# Patient Record
Sex: FEMALE | Race: WHITE | Employment: FULL TIME
[De-identification: names, ages, dates, MRNs, and addresses within clinical notes are randomized per-mention and may not be internally consistent; named-entity substitution may affect disease eponyms.]

---

## 2017-02-03 ENCOUNTER — SURGERY (OUTPATIENT)
Age: 57
End: 2017-02-03

## 2017-11-20 PROBLEM — E55.9 VITAMIN D DEFICIENCY: Status: ACTIVE | Noted: 2017-11-20

## 2018-04-20 ENCOUNTER — SURGERY (OUTPATIENT)
Age: 58
End: 2018-04-20

## 2018-04-20 ENCOUNTER — HOSPITAL ENCOUNTER (OUTPATIENT)
Facility: HOSPITAL | Age: 58
Setting detail: HOSPITAL OUTPATIENT SURGERY
Discharge: HOME | End: 2018-04-20
Attending: INTERNAL MEDICINE | Admitting: INTERNAL MEDICINE
Payer: COMMERCIAL

## 2018-04-20 DIAGNOSIS — K51.019 ULCERATIVE PANCOLITIS WITH COMPLICATION (HCC): ICD-10-CM

## 2018-04-20 PROCEDURE — 0DBM8ZX EXCISION OF DESCENDING COLON, VIA NATURAL OR ARTIFICIAL OPENING ENDOSCOPIC, DIAGNOSTIC: ICD-10-PCS | Performed by: INTERNAL MEDICINE

## 2018-04-20 PROCEDURE — 99153 MOD SED SAME PHYS/QHP EA: CPT | Performed by: INTERNAL MEDICINE

## 2018-04-20 PROCEDURE — 0DBP8ZX EXCISION OF RECTUM, VIA NATURAL OR ARTIFICIAL OPENING ENDOSCOPIC, DIAGNOSTIC: ICD-10-PCS | Performed by: INTERNAL MEDICINE

## 2018-04-20 PROCEDURE — 88305 TISSUE EXAM BY PATHOLOGIST: CPT | Performed by: INTERNAL MEDICINE

## 2018-04-20 PROCEDURE — 99152 MOD SED SAME PHYS/QHP 5/>YRS: CPT | Performed by: INTERNAL MEDICINE

## 2018-04-20 PROCEDURE — 82962 GLUCOSE BLOOD TEST: CPT

## 2018-04-20 RX ORDER — MIDAZOLAM HYDROCHLORIDE 1 MG/ML
1 INJECTION INTRAMUSCULAR; INTRAVENOUS EVERY 5 MIN PRN
Status: DISCONTINUED | OUTPATIENT
Start: 2018-04-20 | End: 2018-04-20

## 2018-04-20 RX ORDER — MAGNESIUM HYDROXIDE 1200 MG/15ML
LIQUID ORAL
Status: DISCONTINUED | OUTPATIENT
Start: 2018-04-20 | End: 2018-04-20

## 2018-04-20 RX ORDER — METHYLENE BLUE 10 MG/ML
INJECTION INTRAVENOUS
Status: DISCONTINUED | OUTPATIENT
Start: 2018-04-20 | End: 2018-04-20

## 2018-04-20 RX ORDER — SODIUM CHLORIDE 0.9 % (FLUSH) 0.9 %
10 SYRINGE (ML) INJECTION AS NEEDED
Status: DISCONTINUED | OUTPATIENT
Start: 2018-04-20 | End: 2018-04-20

## 2018-04-20 RX ORDER — SODIUM CHLORIDE, SODIUM LACTATE, POTASSIUM CHLORIDE, CALCIUM CHLORIDE 600; 310; 30; 20 MG/100ML; MG/100ML; MG/100ML; MG/100ML
INJECTION, SOLUTION INTRAVENOUS CONTINUOUS
Status: DISCONTINUED | OUTPATIENT
Start: 2018-04-20 | End: 2018-04-20

## 2018-04-20 RX ORDER — MIDAZOLAM HYDROCHLORIDE 1 MG/ML
INJECTION INTRAMUSCULAR; INTRAVENOUS
Status: DISCONTINUED | OUTPATIENT
Start: 2018-04-20 | End: 2018-04-20

## 2018-04-20 RX ORDER — SODIUM CHLORIDE 0.9 % (FLUSH) 0.9 %
10 SYRINGE (ML) INJECTION AS NEEDED
Status: CANCELLED | OUTPATIENT
Start: 2018-04-20

## 2018-04-20 RX ORDER — SODIUM CHLORIDE, SODIUM LACTATE, POTASSIUM CHLORIDE, CALCIUM CHLORIDE 600; 310; 30; 20 MG/100ML; MG/100ML; MG/100ML; MG/100ML
INJECTION, SOLUTION INTRAVENOUS CONTINUOUS
Status: CANCELLED | OUTPATIENT
Start: 2018-04-20

## 2018-04-20 NOTE — H&P
RE-PROCEDURE UPDATE    HPI: April S Francesca Sultana is a 62year old female. 2/10/1960. Patient presents for a colonoscopy. ALLERGIES:   Reglan [Metoclopram*          No current outpatient prescriptions on file.   Past Medical History:   Diagnosis Date   •

## 2018-04-20 NOTE — OPERATIVE REPORT
COLONOSCOPY WITH CHROMOENDOSCOPY REPORT    Patient Name:  Laci Momin Record #: [id number]  YOB: 1960  Date of Procedure: 4/20/2018    Referring physician: Dayne Agrawal MD    Surgeon:  Aide Jones.  Adolph Vargas MD    Pre-op diagnos the importance of better disease control even though she feels well lately.   I encouraged her to take her medications consistently as prescribed every day  Await biopsy results  Repeat colonoscopy timing will depend upon review of pathology    Specimens: mona

## 2018-04-21 VITALS
SYSTOLIC BLOOD PRESSURE: 95 MMHG | HEIGHT: 63 IN | RESPIRATION RATE: 16 BRPM | DIASTOLIC BLOOD PRESSURE: 56 MMHG | OXYGEN SATURATION: 92 % | BODY MASS INDEX: 51.91 KG/M2 | HEART RATE: 78 BPM | WEIGHT: 293 LBS

## 2020-01-30 PROBLEM — Z86.0101 HISTORY OF ADENOMATOUS POLYP OF COLON: Status: ACTIVE | Noted: 2020-01-30

## 2020-01-30 PROBLEM — Z86.010 HISTORY OF ADENOMATOUS POLYP OF COLON: Status: ACTIVE | Noted: 2020-01-30

## 2020-02-20 ENCOUNTER — HOSPITAL ENCOUNTER (OUTPATIENT)
Facility: HOSPITAL | Age: 60
Setting detail: HOSPITAL OUTPATIENT SURGERY
Discharge: HOME | End: 2020-02-20
Attending: INTERNAL MEDICINE | Admitting: INTERNAL MEDICINE
Payer: COMMERCIAL

## 2020-02-20 DIAGNOSIS — Z85.038 HISTORY OF COLON CANCER: ICD-10-CM

## 2020-02-20 DIAGNOSIS — K51.019 ULCERATIVE PANCOLITIS WITH COMPLICATION (HCC): ICD-10-CM

## 2020-02-20 DIAGNOSIS — Z86.010 HISTORY OF ADENOMATOUS POLYP OF COLON: ICD-10-CM

## 2020-02-20 PROCEDURE — 87493 C DIFF AMPLIFIED PROBE: CPT | Performed by: INTERNAL MEDICINE

## 2020-02-20 PROCEDURE — 99152 MOD SED SAME PHYS/QHP 5/>YRS: CPT | Performed by: INTERNAL MEDICINE

## 2020-02-20 PROCEDURE — 88305 TISSUE EXAM BY PATHOLOGIST: CPT | Performed by: INTERNAL MEDICINE

## 2020-02-20 PROCEDURE — 99153 MOD SED SAME PHYS/QHP EA: CPT | Performed by: INTERNAL MEDICINE

## 2020-02-20 PROCEDURE — 0DBE8ZX EXCISION OF LARGE INTESTINE, VIA NATURAL OR ARTIFICIAL OPENING ENDOSCOPIC, DIAGNOSTIC: ICD-10-PCS | Performed by: INTERNAL MEDICINE

## 2020-02-20 RX ORDER — SODIUM CHLORIDE, SODIUM LACTATE, POTASSIUM CHLORIDE, CALCIUM CHLORIDE 600; 310; 30; 20 MG/100ML; MG/100ML; MG/100ML; MG/100ML
INJECTION, SOLUTION INTRAVENOUS CONTINUOUS
Status: DISCONTINUED | OUTPATIENT
Start: 2020-02-20 | End: 2020-02-20

## 2020-02-20 RX ORDER — MIDAZOLAM HYDROCHLORIDE 1 MG/ML
1 INJECTION INTRAMUSCULAR; INTRAVENOUS EVERY 5 MIN PRN
Status: DISCONTINUED | OUTPATIENT
Start: 2020-02-20 | End: 2020-02-20

## 2020-02-20 RX ORDER — SODIUM CHLORIDE 0.9 % (FLUSH) 0.9 %
10 SYRINGE (ML) INJECTION AS NEEDED
Status: DISCONTINUED | OUTPATIENT
Start: 2020-02-20 | End: 2020-02-20

## 2020-02-20 RX ORDER — FOLIC ACID 1 MG/1
1 TABLET ORAL DAILY
Qty: 30 TABLET | Refills: 11 | Status: SHIPPED | OUTPATIENT
Start: 2020-02-20

## 2020-02-20 RX ORDER — SULFASALAZINE 500 MG/1
2000 TABLET ORAL 2 TIMES DAILY
Qty: 240 TABLET | Refills: 5 | Status: SHIPPED | OUTPATIENT
Start: 2020-02-20 | End: 2020-09-22

## 2020-02-20 RX ORDER — MIDAZOLAM HYDROCHLORIDE 1 MG/ML
INJECTION INTRAMUSCULAR; INTRAVENOUS
Status: DISCONTINUED | OUTPATIENT
Start: 2020-02-20 | End: 2020-02-20

## 2020-02-20 NOTE — OPERATIVE REPORT
COLONOSCOPY REPORT    Patient Name:  Santiago Krishnamurthy Record #: [id number]  YOB: 1960  Date of Procedure: 2/20/2020    Referring physician: David Lindsey MD    Surgeon:  Sae Winter.  Yolie Irby MD    Pre-op diagnosis: Ulcerative coliti up to 25% of mucosa, but >90% of mucosa seen)  1 Excellent (>95% of mucosa seen)

## 2020-02-20 NOTE — H&P
RE-PROCEDURE UPDATE    HPI: Amanda Davila is a 61year old female. 2/10/1960. Patient presents for a colonoscopy.     ALLERGIES:   Latex                       Comment:Added based on information entered during case             entry, please review and

## 2020-02-21 VITALS
SYSTOLIC BLOOD PRESSURE: 102 MMHG | HEIGHT: 63.5 IN | OXYGEN SATURATION: 89 % | WEIGHT: 293 LBS | RESPIRATION RATE: 15 BRPM | BODY MASS INDEX: 51.27 KG/M2 | HEART RATE: 87 BPM | DIASTOLIC BLOOD PRESSURE: 72 MMHG

## 2023-11-29 ENCOUNTER — LAB ENCOUNTER (OUTPATIENT)
Dept: LAB | Facility: HOSPITAL | Age: 63
End: 2023-11-29
Attending: COLON & RECTAL SURGERY
Payer: COMMERCIAL

## 2023-11-29 DIAGNOSIS — Z01.818 PRE-OP TESTING: ICD-10-CM

## 2023-11-29 LAB
ANION GAP SERPL CALC-SCNC: 7 MMOL/L (ref 0–18)
BUN BLD-MCNC: 20 MG/DL (ref 9–23)
BUN/CREAT SERPL: 15.9 (ref 10–20)
CALCIUM BLD-MCNC: 9.6 MG/DL (ref 8.7–10.4)
CHLORIDE SERPL-SCNC: 107 MMOL/L (ref 98–112)
CO2 SERPL-SCNC: 27 MMOL/L (ref 21–32)
CREAT BLD-MCNC: 1.26 MG/DL
EGFRCR SERPLBLD CKD-EPI 2021: 48 ML/MIN/1.73M2 (ref 60–?)
GLUCOSE BLD-MCNC: 106 MG/DL (ref 70–99)
OSMOLALITY SERPL CALC.SUM OF ELEC: 295 MOSM/KG (ref 275–295)
POTASSIUM SERPL-SCNC: 4.4 MMOL/L (ref 3.5–5.1)
SODIUM SERPL-SCNC: 141 MMOL/L (ref 136–145)

## 2023-11-29 PROCEDURE — 36415 COLL VENOUS BLD VENIPUNCTURE: CPT

## 2023-11-29 PROCEDURE — 87635 SARS-COV-2 COVID-19 AMP PRB: CPT

## 2023-11-29 PROCEDURE — 85025 COMPLETE CBC W/AUTO DIFF WBC: CPT

## 2023-11-29 PROCEDURE — 85060 BLOOD SMEAR INTERPRETATION: CPT

## 2023-11-29 PROCEDURE — 80048 BASIC METABOLIC PNL TOTAL CA: CPT

## 2023-11-30 LAB
BASOPHILS # BLD AUTO: 0.06 X10(3) UL (ref 0–0.2)
BASOPHILS NFR BLD AUTO: 0.6 %
DEPRECATED RDW RBC AUTO: 45 FL (ref 35.1–46.3)
EOSINOPHIL # BLD AUTO: 0.47 X10(3) UL (ref 0–0.7)
EOSINOPHIL NFR BLD AUTO: 4.5 %
ERYTHROCYTE [DISTWIDTH] IN BLOOD BY AUTOMATED COUNT: 13.5 % (ref 11–15)
HCT VFR BLD AUTO: 49.8 %
HGB BLD-MCNC: 15.4 G/DL
IMM GRANULOCYTES # BLD AUTO: 0.04 X10(3) UL (ref 0–1)
IMM GRANULOCYTES NFR BLD: 0.4 %
LYMPHOCYTES # BLD AUTO: 2.61 X10(3) UL (ref 1–4)
LYMPHOCYTES NFR BLD AUTO: 24.8 %
MCH RBC QN AUTO: 27.9 PG (ref 26–34)
MCHC RBC AUTO-ENTMCNC: 30.9 G/DL (ref 31–37)
MCV RBC AUTO: 90.2 FL
MONOCYTES # BLD AUTO: 0.8 X10(3) UL (ref 0.1–1)
MONOCYTES NFR BLD AUTO: 7.6 %
NEUTROPHILS # BLD AUTO: 6.53 X10 (3) UL (ref 1.5–7.7)
NEUTROPHILS # BLD AUTO: 6.53 X10(3) UL (ref 1.5–7.7)
NEUTROPHILS NFR BLD AUTO: 62.1 %
PLATELET # BLD AUTO: 278 10(3)UL (ref 150–450)
RBC # BLD AUTO: 5.52 X10(6)UL
SARS-COV-2 RNA RESP QL NAA+PROBE: NOT DETECTED
WBC # BLD AUTO: 10.5 X10(3) UL (ref 4–11)

## 2023-11-30 RX ORDER — CEFAZOLIN SODIUM IN 0.9 % NACL 3 G/100 ML
3 INTRAVENOUS SOLUTION, PIGGYBACK (ML) INTRAVENOUS ONCE
Status: CANCELLED | OUTPATIENT
Start: 2023-11-30 | End: 2023-11-30

## 2023-12-01 ENCOUNTER — HOSPITAL ENCOUNTER (OUTPATIENT)
Facility: HOSPITAL | Age: 63
Setting detail: HOSPITAL OUTPATIENT SURGERY
Discharge: HOME | End: 2023-12-01
Attending: COLON & RECTAL SURGERY | Admitting: COLON & RECTAL SURGERY
Payer: COMMERCIAL

## 2023-12-01 ENCOUNTER — ANESTHESIA (OUTPATIENT)
Dept: SURGERY | Facility: HOSPITAL | Age: 63
End: 2023-12-01
Payer: COMMERCIAL

## 2023-12-01 ENCOUNTER — ANESTHESIA EVENT (OUTPATIENT)
Dept: SURGERY | Facility: HOSPITAL | Age: 63
End: 2023-12-01
Payer: COMMERCIAL

## 2023-12-01 VITALS
HEIGHT: 63 IN | SYSTOLIC BLOOD PRESSURE: 123 MMHG | RESPIRATION RATE: 16 BRPM | BODY MASS INDEX: 47.84 KG/M2 | WEIGHT: 270 LBS | DIASTOLIC BLOOD PRESSURE: 56 MMHG | TEMPERATURE: 97 F | HEART RATE: 101 BPM | OXYGEN SATURATION: 86 %

## 2023-12-01 DIAGNOSIS — Z01.818 PRE-OP TESTING: Primary | ICD-10-CM

## 2023-12-01 LAB — GLUCOSE BLDC GLUCOMTR-MCNC: 112 MG/DL (ref 70–99)

## 2023-12-01 PROCEDURE — 0DBP8ZX EXCISION OF RECTUM, VIA NATURAL OR ARTIFICIAL OPENING ENDOSCOPIC, DIAGNOSTIC: ICD-10-PCS | Performed by: COLON & RECTAL SURGERY

## 2023-12-01 PROCEDURE — 82962 GLUCOSE BLOOD TEST: CPT

## 2023-12-01 RX ORDER — MIDAZOLAM HYDROCHLORIDE 1 MG/ML
INJECTION INTRAMUSCULAR; INTRAVENOUS AS NEEDED
Status: DISCONTINUED | OUTPATIENT
Start: 2023-12-01 | End: 2023-12-01 | Stop reason: SURG

## 2023-12-01 RX ORDER — ONDANSETRON 2 MG/ML
INJECTION INTRAMUSCULAR; INTRAVENOUS AS NEEDED
Status: DISCONTINUED | OUTPATIENT
Start: 2023-12-01 | End: 2023-12-01 | Stop reason: SURG

## 2023-12-01 RX ORDER — LEVOFLOXACIN 5 MG/ML
500 INJECTION, SOLUTION INTRAVENOUS ONCE
Status: DISCONTINUED | OUTPATIENT
Start: 2023-12-01 | End: 2023-12-01 | Stop reason: HOSPADM

## 2023-12-01 RX ORDER — METRONIDAZOLE 500 MG/100ML
500 INJECTION, SOLUTION INTRAVENOUS ONCE
Status: DISCONTINUED | OUTPATIENT
Start: 2023-12-01 | End: 2023-12-01 | Stop reason: HOSPADM

## 2023-12-01 RX ORDER — SODIUM CHLORIDE, SODIUM LACTATE, POTASSIUM CHLORIDE, CALCIUM CHLORIDE 600; 310; 30; 20 MG/100ML; MG/100ML; MG/100ML; MG/100ML
INJECTION, SOLUTION INTRAVENOUS CONTINUOUS
Status: DISCONTINUED | OUTPATIENT
Start: 2023-12-01 | End: 2023-12-01

## 2023-12-01 RX ORDER — GLYCOPYRROLATE 0.2 MG/ML
INJECTION, SOLUTION INTRAMUSCULAR; INTRAVENOUS AS NEEDED
Status: DISCONTINUED | OUTPATIENT
Start: 2023-12-01 | End: 2023-12-01 | Stop reason: SURG

## 2023-12-01 RX ORDER — ACETAMINOPHEN 500 MG
1000 TABLET ORAL ONCE
Status: COMPLETED | OUTPATIENT
Start: 2023-12-01 | End: 2023-12-01

## 2023-12-01 RX ORDER — DEXAMETHASONE SODIUM PHOSPHATE 4 MG/ML
VIAL (ML) INJECTION AS NEEDED
Status: DISCONTINUED | OUTPATIENT
Start: 2023-12-01 | End: 2023-12-01 | Stop reason: SURG

## 2023-12-01 RX ORDER — FAMOTIDINE 10 MG/ML
20 INJECTION, SOLUTION INTRAVENOUS ONCE
Status: COMPLETED | OUTPATIENT
Start: 2023-12-01 | End: 2023-12-01

## 2023-12-01 RX ORDER — FAMOTIDINE 20 MG/1
20 TABLET, FILM COATED ORAL ONCE
Status: COMPLETED | OUTPATIENT
Start: 2023-12-01 | End: 2023-12-01

## 2023-12-01 RX ADMIN — ONDANSETRON 4 MG: 2 INJECTION INTRAMUSCULAR; INTRAVENOUS at 13:56:00

## 2023-12-01 RX ADMIN — GLYCOPYRROLATE 0.2 MG: 0.2 INJECTION, SOLUTION INTRAMUSCULAR; INTRAVENOUS at 13:56:00

## 2023-12-01 RX ADMIN — MIDAZOLAM HYDROCHLORIDE 2 MG: 1 INJECTION INTRAMUSCULAR; INTRAVENOUS at 13:56:00

## 2023-12-01 RX ADMIN — DEXAMETHASONE SODIUM PHOSPHATE 8 MG: 4 MG/ML VIAL (ML) INJECTION at 13:56:00

## 2023-12-01 NOTE — DISCHARGE INSTRUCTIONS
Reschedule surgery in 2-3 weeks. See PCP a few days before to assess adequate pulmonary function recovery for general anesthesia        HOME INSTRUCTIONS  [facility] HOME CARE INSTRUCTIONS: POST-OP ANESTHESIA  The medication that you received for sedation or general anesthesia can last up to 24 hours. Your judgment and reflexes may be altered, even if you feel like your normal self. We Recommend:   Do not drive any motor vehicle or bicycle   Avoid mowing the lawn, playing sports, or working with power tools/applicances (power saws, electric knives or mixers)   That you have someone stay with you on your first night home   Do not drink alcohol or take sleeping pills or tranquilizers   Do not sign legal documents within 24 hours of your procedure   If you had a nerve block for your surgery, take extra care not to put any pressure on your arm or hand for 24 hours    It is normal:  For you to have a sore throat if you had a breathing tube during surgery (while you were asleep!). The sore throat should get better within 48 hours. You can gargle with warm salt water (1/2 tsp in 4 oz warm water) or use a throat lozenge for comfort  To feel muscle aches or soreness especially in the abdomen, chest or neck. The achy feeling should go away in the next 24 hours  To feel weak, sleepy or \"wiped out\". Your should start feeling better in the next 24 hours. To experience mild discomforts such as sore lip or tongue, headache, cramps, gas pains or a bloated feeling in your abdomen. To experience mild back pain or soreness for a day or two if you had spinal or epidural anesthesia. If you had laparoscopic surgery, to feel shoulder pain or discomfort on the day of surgery. For some patients to have nausea after surgery/anesthesia    If you feel nausea or experience vomiting:   Try to move around less.    Eat less than usual or drink only liquids until the next morning   Nausea should resolve in about 24 hours    If you have a problem when you are at home:    Call your surgeons office

## 2023-12-01 NOTE — OPERATIVE REPORT
Surgery cancelled before induction based on discussion with Anesthesia, Dr. Ele Grissom, with pulmonary concerns of   -Morbid obesity  -Recent URI with persistent cough  -Baseline oxygen sat 92%  -Desat to70s when placed flat/supine    Rectal polyp does not appear advanced based on endoscopic images from colonoscopy 4 months ago and inability to palpate the lesion. We will reschedule in 2-3 weeks and she will see her PCP a few days before surgery to assess pulmonary status. Discussed with patient and daughter, Garfield Milton.     Kirti Dash MD

## 2023-12-01 NOTE — ADDENDUM NOTE
Addendum  created 12/01/23 1446 by Lacy Chandler CRNA    Attestation recorded in [facility] 97 filed

## 2023-12-01 NOTE — DISCHARGE SUMMARY
Outpatient Surgery Brief Discharge Summary         Patient ID:  April Mell Espinosa  [id number]  61year old  2/10/1960    Admission date:  12/01/23    Discharge date: 12/01/23    Discharge Diagnoses: rectal polyp    Outpatient transanal excision of rectal polyp canceled due to recent URI, poor oxygenation when placed supine. Procedures:   Discharge Procedure Orders   Basic Metabolic Panel (8)   Standing Status: Future Number of Occurrences: 1 Standing Exp. Date: 11/28/24     Order Specific Question Answer Comments   Release to patient Immediate      CBC W Differential W Platelet   Standing Status: Future Number of Occurrences: 1 Standing Exp. Date: 11/28/24     Order Specific Question Answer Comments   Release to patient Immediate      SARS-CoV-2 by PCR ([facility])   Standing Status: Future Number of Occurrences: 1 Standing Exp. Date: 11/28/24     Order Specific Question Answer Comments   Release to patient Immediate        Discharged Condition: stable    Disposition: home    Patient Instructions: reschedule surgery in 2-3 weeks.  See PCP a few days before to assess adequate pulmonary function recovery for general anesthesia    Diet: regular diet  Activity: no restrictions    Nimo Bell MD, FACS, FASCRS  12/1/2023  2:24 PM

## 2023-12-01 NOTE — OR PREOP
Dr. Isidra Davidson updated on patient getting over a cold. COVID neg-., + cough. + wheezing on expiration. MD stated she will assess patient.

## 2023-12-30 RX ORDER — AMOXICILLIN AND CLAVULANATE POTASSIUM 875; 125 MG/1; MG/1
1 TABLET, FILM COATED ORAL 2 TIMES DAILY
COMMUNITY

## 2024-01-03 NOTE — DISCHARGE INSTRUCTIONS
There are no external wounds or need for wound care.  Sitz baths may be used as needed for comfort.  Use Miralax 17g daily as needed to avoid hard stools.   HOME INSTRUCTIONS  [facility] HOME CARE INSTRUCTIONS: POST-OP ANESTHESIA  The medication that you received for sedation or general anesthesia can last up to 24 hours. Your judgment and reflexes may be altered, even if you feel like your normal self.      We Recommend:   Do not drive any motor vehicle or bicycle   Avoid mowing the lawn, playing sports, or working with power tools/applicances (power saws, electric knives or mixers)   That you have someone stay with you on your first night home   Do not drink alcohol or take sleeping pills or tranquilizers   Do not sign legal documents within 24 hours of your procedure   If you had a nerve block for your surgery, take extra care not to put any pressure on your arm or hand for 24 hours    It is normal:  For you to have a sore throat if you had a breathing tube during surgery (while you were asleep!). The sore throat should get better within 48 hours. You can gargle with warm salt water (1/2 tsp in 4 oz warm water) or use a throat lozenge for comfort  To feel muscle aches or soreness especially in the abdomen, chest or neck. The achy feeling should go away in the next 24 hours  To feel weak, sleepy or \"wiped out\". Your should start feeling better in the next 24 hours.   To experience mild discomforts such as sore lip or tongue, headache, cramps, gas pains or a bloated feeling in your abdomen.   To experience mild back pain or soreness for a day or two if you had spinal or epidural anesthesia.   If you had laparoscopic surgery, to feel shoulder pain or discomfort on the day of surgery.   For some patients to have nausea after surgery/anesthesia    If you feel nausea or experience vomiting:   Try to move around less.   Eat less than usual or drink only liquids until the next morning   Nausea should resolve in about 24  hours    If you have a problem when you are at home:    Call your surgeons office   Discharge Instructions: After Your Surgery  You’ve just had surgery. During surgery, you were given medicine called anesthesia to keep you relaxed and free of pain. After surgery, you may have some pain or nausea. This is common. Here are some tips for feeling better and getting well after surgery.   Going home  Your healthcare provider will show you how to take care of yourself when you go home. They'll also answer your questions. Have an adult family member or friend drive you home. For the first 24 hours after your surgery:   Don't drive or use heavy equipment.  Don't make important decisions or sign legal papers.  Take medicines as directed.  Don't drink alcohol.  Have someone stay with you, if needed. They can watch for problems and help keep you safe.  Be sure to go to all follow-up visits with your healthcare provider. And rest after your surgery for as long as your provider tells you to.   Coping with pain  If you have pain after surgery, pain medicine will help you feel better. Take it as directed, before pain becomes severe. Also, ask your healthcare provider or pharmacist about other ways to control pain. This might be with heat, ice, or relaxation. And follow any other instructions your surgeon or nurse gives you.      Stay on schedule with your medicine.     Tips for taking pain medicine  To get the best relief possible, remember these points:   Pain medicines can upset your stomach. Taking them with a little food may help.  Most pain relievers taken by mouth need at least 20 to 30 minutes to start to work.  Don't wait till your pain becomes severe before you take your medicine. Try to time your medicine so that you can take it before starting an activity. This might be before you get dressed, go for a walk, or sit down for dinner.  Constipation is a common side effect of some pain medicines. Call your healthcare  provider before taking any medicines such as laxatives or stool softeners to help ease constipation. Also ask if you should skip any foods. Drinking lots of fluids and eating foods such as fruits and vegetables that are high in fiber can also help. Remember, don't take laxatives unless your surgeon has prescribed them.  Drinking alcohol and taking pain medicine can cause dizziness and slow your breathing. It can even be deadly. Don't drink alcohol while taking pain medicine.  Pain medicine can make you react more slowly to things. Don't drive or run machinery while taking pain medicine.  Your healthcare provider may tell you to take acetaminophen to help ease your pain. Ask them how much you're supposed to take each day. Acetaminophen or other pain relievers may interact with your prescription medicines or other over-the-counter (OTC) medicines. Some prescription medicines have acetaminophen and other ingredients in them. Using both prescription and OTC acetaminophen for pain can cause you to accidentally overdose. Read the labels on your OTC medicines with care. This will help you to clearly know the list of ingredients, how much to take, and any warnings. It may also help you not take too much acetaminophen. If you have questions or don't understand the information, ask your pharmacist or healthcare provider to explain it to you before you take the OTC medicine.   Managing nausea  Some people have an upset stomach (nausea) after surgery. This is often because of anesthesia, pain, or pain medicine, less movement of food in the stomach, or the stress of surgery. These tips will help you handle nausea and eat healthy foods as you get better. If you were on a special food plan before surgery, ask your healthcare provider if you should follow it while you get better. Check with your provider on how your eating should progress. It may depend on the surgery you had. These general tips may help:   Don't push yourself to  eat. Your body will tell you when to eat and how much.  Start off with clear liquids and soup. They're easier to digest.  Next try semi-solid foods as you feel ready. These include mashed potatoes, applesauce, and gelatin.  Slowly move to solid foods. Don’t eat fatty, rich, or spicy foods at first.  Don't force yourself to have 3 large meals a day. Instead eat smaller amounts more often.  Take pain medicines with a small amount of solid food, such as crackers or toast. This helps prevent nausea.  When to call your healthcare provider  Call your healthcare provider right away if you have any of these:   You still have too much pain, or the pain gets worse, after taking the medicine. The medicine may not be strong enough. Or there may be a complication from the surgery.  You feel too sleepy, dizzy, or groggy. The medicine may be too strong.  Side effects such as nausea or vomiting. Your healthcare provider may advise taking other medicines to .  Skin changes such as rash, itching, or hives. This may mean you have an allergic reaction. Your provider may advise taking other medicines.  The incision looks different (for instance, part of it opens up).  Bleeding or fluid leaking from the incision site, and weren't told to expect that.  Fever of 100.4°F (38°C) or higher, or as directed by your provider.  Call 911  Call 911 right away if you have:   Trouble breathing  Facial swelling    If you have obstructive sleep apnea   You were given anesthesia medicine during surgery to keep you comfortable and free of pain. After surgery, you may have more apnea spells because of this medicine and other medicines you were given. The spells may last longer than normal.    At home:  Keep using the continuous positive airway pressure (CPAP) device when you sleep. Unless your healthcare provider tells you not to, use it when you sleep, day or night. CPAP is a common device used to treat obstructive sleep apnea.  Talk with your provider  before taking any pain medicine, muscle relaxants, or sedatives. Your provider will tell you about the possible dangers of taking these medicines.  Contact your provider if your sleeping changes a lot even when taking medicines as directed.  [employer] last reviewed this educational content on 10/1/2021  © 3512-9413 [employer]. All rights reserved. This information is not intended as a substitute for professional medical care. Always follow your healthcare professional's instructions.

## 2024-01-05 ENCOUNTER — HOSPITAL ENCOUNTER (OUTPATIENT)
Facility: HOSPITAL | Age: 64
Setting detail: HOSPITAL OUTPATIENT SURGERY
Discharge: HOME | End: 2024-01-05
Attending: COLON & RECTAL SURGERY | Admitting: COLON & RECTAL SURGERY
Payer: COMMERCIAL

## 2024-01-05 ENCOUNTER — ANESTHESIA EVENT (OUTPATIENT)
Dept: SURGERY | Facility: HOSPITAL | Age: 64
End: 2024-01-05
Payer: COMMERCIAL

## 2024-01-05 ENCOUNTER — ANESTHESIA (OUTPATIENT)
Dept: SURGERY | Facility: HOSPITAL | Age: 64
End: 2024-01-05
Payer: COMMERCIAL

## 2024-01-05 VITALS
DIASTOLIC BLOOD PRESSURE: 61 MMHG | HEIGHT: 63 IN | BODY MASS INDEX: 48.9 KG/M2 | TEMPERATURE: 98 F | RESPIRATION RATE: 15 BRPM | WEIGHT: 276 LBS | SYSTOLIC BLOOD PRESSURE: 108 MMHG | HEART RATE: 73 BPM | OXYGEN SATURATION: 94 %

## 2024-01-05 LAB
GLUCOSE BLDC GLUCOMTR-MCNC: 111 MG/DL (ref 70–99)
GLUCOSE BLDC GLUCOMTR-MCNC: 128 MG/DL (ref 70–99)

## 2024-01-05 PROCEDURE — 0DBP7ZX EXCISION OF RECTUM, VIA NATURAL OR ARTIFICIAL OPENING, DIAGNOSTIC: ICD-10-PCS | Performed by: COLON & RECTAL SURGERY

## 2024-01-05 PROCEDURE — 88305 TISSUE EXAM BY PATHOLOGIST: CPT | Performed by: COLON & RECTAL SURGERY

## 2024-01-05 PROCEDURE — 82962 GLUCOSE BLOOD TEST: CPT

## 2024-01-05 RX ORDER — NICOTINE POLACRILEX 4 MG
30 LOZENGE BUCCAL
Status: DISCONTINUED | OUTPATIENT
Start: 2024-01-05 | End: 2024-01-05

## 2024-01-05 RX ORDER — DEXTROSE MONOHYDRATE 25 G/50ML
50 INJECTION, SOLUTION INTRAVENOUS
Status: DISCONTINUED | OUTPATIENT
Start: 2024-01-05 | End: 2024-01-05

## 2024-01-05 RX ORDER — MORPHINE SULFATE 4 MG/ML
4 INJECTION, SOLUTION INTRAMUSCULAR; INTRAVENOUS EVERY 10 MIN PRN
Status: DISCONTINUED | OUTPATIENT
Start: 2024-01-05 | End: 2024-01-05

## 2024-01-05 RX ORDER — HYDROMORPHONE HYDROCHLORIDE 1 MG/ML
0.6 INJECTION, SOLUTION INTRAMUSCULAR; INTRAVENOUS; SUBCUTANEOUS EVERY 5 MIN PRN
Status: DISCONTINUED | OUTPATIENT
Start: 2024-01-05 | End: 2024-01-05

## 2024-01-05 RX ORDER — NICOTINE POLACRILEX 4 MG
15 LOZENGE BUCCAL
Status: DISCONTINUED | OUTPATIENT
Start: 2024-01-05 | End: 2024-01-05

## 2024-01-05 RX ORDER — METRONIDAZOLE 500 MG/100ML
500 INJECTION, SOLUTION INTRAVENOUS ONCE
Status: COMPLETED | OUTPATIENT
Start: 2024-01-05 | End: 2024-01-05

## 2024-01-05 RX ORDER — BUPIVACAINE HYDROCHLORIDE 5 MG/ML
INJECTION, SOLUTION EPIDURAL; INTRACAUDAL AS NEEDED
Status: DISCONTINUED | OUTPATIENT
Start: 2024-01-05 | End: 2024-01-05 | Stop reason: HOSPADM

## 2024-01-05 RX ORDER — ROCURONIUM BROMIDE 10 MG/ML
INJECTION, SOLUTION INTRAVENOUS AS NEEDED
Status: DISCONTINUED | OUTPATIENT
Start: 2024-01-05 | End: 2024-01-05 | Stop reason: SURG

## 2024-01-05 RX ORDER — DEXAMETHASONE SODIUM PHOSPHATE 4 MG/ML
VIAL (ML) INJECTION AS NEEDED
Status: DISCONTINUED | OUTPATIENT
Start: 2024-01-05 | End: 2024-01-05 | Stop reason: SURG

## 2024-01-05 RX ORDER — ACETAMINOPHEN 500 MG
1000 TABLET ORAL ONCE
Status: COMPLETED | OUTPATIENT
Start: 2024-01-05 | End: 2024-01-05

## 2024-01-05 RX ORDER — LEVOFLOXACIN 5 MG/ML
500 INJECTION, SOLUTION INTRAVENOUS ONCE
Status: COMPLETED | OUTPATIENT
Start: 2024-01-05 | End: 2024-01-05

## 2024-01-05 RX ORDER — SODIUM CHLORIDE, SODIUM LACTATE, POTASSIUM CHLORIDE, CALCIUM CHLORIDE 600; 310; 30; 20 MG/100ML; MG/100ML; MG/100ML; MG/100ML
INJECTION, SOLUTION INTRAVENOUS CONTINUOUS
Status: DISCONTINUED | OUTPATIENT
Start: 2024-01-05 | End: 2024-01-05

## 2024-01-05 RX ORDER — ONDANSETRON 2 MG/ML
INJECTION INTRAMUSCULAR; INTRAVENOUS AS NEEDED
Status: DISCONTINUED | OUTPATIENT
Start: 2024-01-05 | End: 2024-01-05 | Stop reason: SURG

## 2024-01-05 RX ORDER — NALOXONE HYDROCHLORIDE 0.4 MG/ML
0.08 INJECTION, SOLUTION INTRAMUSCULAR; INTRAVENOUS; SUBCUTANEOUS AS NEEDED
Status: DISCONTINUED | OUTPATIENT
Start: 2024-01-05 | End: 2024-01-05

## 2024-01-05 RX ORDER — MIDAZOLAM HYDROCHLORIDE 1 MG/ML
INJECTION INTRAMUSCULAR; INTRAVENOUS AS NEEDED
Status: DISCONTINUED | OUTPATIENT
Start: 2024-01-05 | End: 2024-01-05 | Stop reason: SURG

## 2024-01-05 RX ORDER — HYDROMORPHONE HYDROCHLORIDE 1 MG/ML
0.2 INJECTION, SOLUTION INTRAMUSCULAR; INTRAVENOUS; SUBCUTANEOUS EVERY 5 MIN PRN
Status: DISCONTINUED | OUTPATIENT
Start: 2024-01-05 | End: 2024-01-05

## 2024-01-05 RX ORDER — HYDROMORPHONE HYDROCHLORIDE 1 MG/ML
0.4 INJECTION, SOLUTION INTRAMUSCULAR; INTRAVENOUS; SUBCUTANEOUS EVERY 5 MIN PRN
Status: DISCONTINUED | OUTPATIENT
Start: 2024-01-05 | End: 2024-01-05

## 2024-01-05 RX ORDER — FAMOTIDINE 20 MG/1
20 TABLET, FILM COATED ORAL ONCE
Status: COMPLETED | OUTPATIENT
Start: 2024-01-05 | End: 2024-01-05

## 2024-01-05 RX ORDER — GLYCOPYRROLATE 0.2 MG/ML
INJECTION, SOLUTION INTRAMUSCULAR; INTRAVENOUS AS NEEDED
Status: DISCONTINUED | OUTPATIENT
Start: 2024-01-05 | End: 2024-01-05 | Stop reason: SURG

## 2024-01-05 RX ORDER — MORPHINE SULFATE 4 MG/ML
2 INJECTION, SOLUTION INTRAMUSCULAR; INTRAVENOUS EVERY 10 MIN PRN
Status: DISCONTINUED | OUTPATIENT
Start: 2024-01-05 | End: 2024-01-05

## 2024-01-05 RX ORDER — MORPHINE SULFATE 10 MG/ML
6 INJECTION, SOLUTION INTRAMUSCULAR; INTRAVENOUS EVERY 10 MIN PRN
Status: DISCONTINUED | OUTPATIENT
Start: 2024-01-05 | End: 2024-01-05

## 2024-01-05 RX ORDER — LIDOCAINE HYDROCHLORIDE 10 MG/ML
INJECTION, SOLUTION EPIDURAL; INFILTRATION; INTRACAUDAL; PERINEURAL AS NEEDED
Status: DISCONTINUED | OUTPATIENT
Start: 2024-01-05 | End: 2024-01-05 | Stop reason: SURG

## 2024-01-05 RX ORDER — POLYETHYLENE GLYCOL 3350 17 G/17G
17 POWDER, FOR SOLUTION ORAL DAILY PRN
Qty: 250 G | Refills: 2 | Status: SHIPPED | OUTPATIENT
Start: 2024-01-05

## 2024-01-05 RX ORDER — FAMOTIDINE 10 MG/ML
20 INJECTION, SOLUTION INTRAVENOUS ONCE
Status: COMPLETED | OUTPATIENT
Start: 2024-01-05 | End: 2024-01-05

## 2024-01-05 RX ORDER — EPHEDRINE SULFATE 50 MG/ML
INJECTION, SOLUTION INTRAVENOUS AS NEEDED
Status: DISCONTINUED | OUTPATIENT
Start: 2024-01-05 | End: 2024-01-05 | Stop reason: SURG

## 2024-01-05 RX ADMIN — EPHEDRINE SULFATE 5 MG: 50 INJECTION, SOLUTION INTRAVENOUS at 13:29:00

## 2024-01-05 RX ADMIN — METRONIDAZOLE 500 MG: 500 INJECTION, SOLUTION INTRAVENOUS at 13:38:00

## 2024-01-05 RX ADMIN — ONDANSETRON 4 MG: 2 INJECTION INTRAMUSCULAR; INTRAVENOUS at 13:17:00

## 2024-01-05 RX ADMIN — LIDOCAINE HYDROCHLORIDE 50 MG: 10 INJECTION, SOLUTION EPIDURAL; INFILTRATION; INTRACAUDAL; PERINEURAL at 13:15:00

## 2024-01-05 RX ADMIN — MIDAZOLAM HYDROCHLORIDE 1 MG: 1 INJECTION INTRAMUSCULAR; INTRAVENOUS at 13:05:00

## 2024-01-05 RX ADMIN — ROCURONIUM BROMIDE 10 MG: 10 INJECTION, SOLUTION INTRAVENOUS at 13:15:00

## 2024-01-05 RX ADMIN — GLYCOPYRROLATE 0.2 MG: 0.2 INJECTION, SOLUTION INTRAMUSCULAR; INTRAVENOUS at 13:05:00

## 2024-01-05 RX ADMIN — DEXAMETHASONE SODIUM PHOSPHATE 4 MG: 4 MG/ML VIAL (ML) INJECTION at 13:17:00

## 2024-01-05 RX ADMIN — ROCURONIUM BROMIDE 40 MG: 10 INJECTION, SOLUTION INTRAVENOUS at 13:17:00

## 2024-01-05 RX ADMIN — SODIUM CHLORIDE, SODIUM LACTATE, POTASSIUM CHLORIDE, CALCIUM CHLORIDE: 600; 310; 30; 20 INJECTION, SOLUTION INTRAVENOUS at 14:31:00

## 2024-01-05 RX ADMIN — EPHEDRINE SULFATE 5 MG: 50 INJECTION, SOLUTION INTRAVENOUS at 13:50:00

## 2024-01-05 RX ADMIN — LEVOFLOXACIN 500 MG: 5 INJECTION, SOLUTION INTRAVENOUS at 13:19:00

## 2024-01-05 RX ADMIN — EPHEDRINE SULFATE 5 MG: 50 INJECTION, SOLUTION INTRAVENOUS at 14:12:00

## 2024-01-05 NOTE — ANESTHESIA PROCEDURE NOTES
Airway  Date/Time: 1/5/2024 1:18 PM  Urgency: Elective    Airway not difficult    General Information and Staff    Patient location during procedure: OR  Anesthesiologist: Rubén Macdonald MD  Resident/CRNA: Irena Randle CRNA  Performed: CRNA   Performed by: Irena Randle CRNA  Authorized by: Rubén Macdonald MD      Indications and Patient Condition  Indications for airway management: anesthesia  Sedation level: deep  Preoxygenated: yes  Patient position: sniffing  Mask difficulty assessment: 2 - vent by mask + OA or adjuvant +/- NMBA    Final Airway Details  Final airway type: endotracheal airway      Successful airway: ETT  Cuffed: yes   Successful intubation technique: Video laryngoscopy  Facilitating devices/methods: intubating stylet and cricoid pressure  Endotracheal tube insertion site: oral  Blade: GlideScope  Blade size: #3  ETT size (mm): 7.0    Cormack-Lehane Classification: grade I - full view of glottis  Placement verified by: capnometry   Measured from: teeth  ETT to teeth (cm): 21  Number of attempts at approach: 1    Additional Comments  2 handed mask with oral airway and headstrap. Atraumatic intubation. (+) ETCO2 and BBSE. ETT secured.

## 2024-01-05 NOTE — PROGRESS NOTES
1228: Dr. Romano made aware of cough with mucus patient has. At bedside assessing patient.     1239: Dr. Romano \"ok to for surgery\"

## 2024-01-05 NOTE — ANESTHESIA PREPROCEDURE EVALUATION
Anesthesia PreOp Note    HPI:     Amanda Huang is a 63 year old female who presents for preoperative consultation requested by: Manny Pereira MD    Date of Surgery: 1/5/2024    Procedure(s):  Proctoscopy, excision of transanal polyp  Indication: Rectal polyp, rectal cancer    Relevant Problems   No relevant active problems       NPO:  Last Liquid Consumption Date: 01/04/24  Last Liquid Consumption Time: 2100  Last Solid Consumption Date: 01/05/24  Last Solid Consumption Time: 0100  Last Liquid Consumption Date: 01/04/24          History Review:  Patient Active Problem List    Diagnosis Date Noted    History of adenomatous polyp of colon 01/30/2020    Vitamin D deficiency 11/20/2017    History of colon cancer 09/14/2016    Ulcerative pancolitis with complication (HCC) 09/14/2016    Abscess, axilla 11/15/2011    Allergic rhinitis, cause unspecified 04/28/2011    Asthma 04/28/2011    Cervical polyp 09/15/2010    Obese 09/15/2010       Past Medical History:   Diagnosis Date    Adenomatous polyp of colon 05/2013    ALLERGIC RHINITIS     Anxiety state     Calculus of kidney     Colitis     Colon cancer (HCC) 01/2012    Diabetes (HCC)     Disorder of liver     fatty liver    High blood pressure     IBS (irritable bowel syndrome)     Neuropathy     OBESITY     OTHER DISEASES     ulcerative colitis in remission    PONV (postoperative nausea and vomiting)     SEASONAL ALLERGIES     Visual impairment        Past Surgical History:   Procedure Laterality Date    APPENDECTOMY      CHOLECYSTECTOMY      COLECTOMY  2012    80% removed    COLONOSCOPY  4/18, 2/17, 7/15, 4/15,  4/14, 12/13, 5/13, 12, 03    chromoendoscopy (7/15, 2/17, 4/18)    COLONOSCOPY N/A 02/03/2017    Procedure: COLONOSCOPY;  Surgeon: Obie Rosa MD;  Location: [facility] ENDOSCOPY    COLONOSCOPY N/A 04/20/2018    Procedure: COLONOSCOPY;  Surgeon: Obie Rosa MD;  Location: [facility] ENDOSCOPY    COLONOSCOPY  02/2020    COLONOSCOPY N/A 02/20/2020    Procedure:  COLONOSCOPY;  Surgeon: Obie Rosa MD;  Location: [facility] ENDOSCOPY    D & C      REMOVAL OF KIDNEY STONE      x2    TONSILLECTOMY         Medications Prior to Admission   Medication Sig Dispense Refill Last Dose    amoxicillin clavulanate 875-125 MG Oral Tab Take 1 tablet by mouth 2 (two) times daily.   1/5/2024 at 0900    Cholecalciferol (VITAMIN D3) 3000 UNITS Oral Tab Take by mouth daily.   More than a month    aspirin 81 MG Oral Tab Take 1 tablet (81 mg total) by mouth daily.        Current Facility-Administered Medications Ordered in Epic   Medication Dose Route Frequency Provider Last Rate Last Admin    lactated ringers infusion   Intravenous Continuous BrandManny MD 20 mL/hr at 01/05/24 1219 New Bag at 01/05/24 1219    levoFLOXacin in dextrose 5% (Levaquin) 500 mg/100mL IVPB premix 500 mg  500 mg Intravenous Once Manny Pereira MD        metRONIDAZOLE in sodium chloride 0.79% (Flagyl) 5 mg/mL IVPB premix 500 mg  500 mg Intravenous Once Manny Pereira MD         No current [facility]-ordered outpatient medications on file.       Allergies   Allergen Reactions    Cat Hair Extract SWELLING     Hay fever    Reglan [Metoclopramide] OTHER (SEE COMMENTS)     Causes pain in legs    Adhesive Tape RASH     blisters    Latex RASH       Family History   Problem Relation Age of Onset    Diabetes Father     Cancer Mother         breast     Social History     Socioeconomic History    Marital status:    Tobacco Use    Smoking status: Never    Smokeless tobacco: Never   Vaping Use    Vaping Use: Never used   Substance and Sexual Activity    Alcohol use: Not Currently    Drug use: No       Available pre-op labs reviewed.  Lab Results   Component Value Date    WBC 10.5 11/29/2023    RBC 5.52 (H) 11/29/2023    HGB 15.4 11/29/2023    HCT 49.8 (H) 11/29/2023    MCV 90.2 11/29/2023    MCH 27.9 11/29/2023    MCHC 30.9 (L) 11/29/2023    RDW 13.5 11/29/2023    .0 11/29/2023     Lab Results   Component Value Date      11/29/2023    K 4.4 11/29/2023     11/29/2023    CO2 27.0 11/29/2023    BUN 20 11/29/2023    CREATSERUM 1.26 (H) 11/29/2023     (H) 11/29/2023    PGLU 112 (H) 12/01/2023    CA 9.6 11/29/2023          Vital Signs:  Body mass index is 48.89 kg/m².   height is 1.6 m (5' 3\") and weight is 125.2 kg (276 lb). Her oral temperature is 98 °F (36.7 °C). Her blood pressure is 128/67 and her pulse is 88. Her respiration is 16 and oxygen saturation is 96%.   Vitals:    12/29/23 0915 01/05/24 1206   BP:  128/67   Pulse:  88   Resp:  16   Temp:  98 °F (36.7 °C)   TempSrc:  Oral   SpO2:  96%   Weight: 122.5 kg (270 lb) 125.2 kg (276 lb)   Height: 1.6 m (5' 3\") 1.6 m (5' 3\")        Anesthesia Evaluation     Patient summary reviewed and Nursing notes reviewed    History of anesthetic complications   Airway   Mallampati: I  TM distance: >3 FB  Neck ROM: full  Dental      Pulmonary - normal exam   (+) asthma  Cardiovascular - normal exam  (+) hypertension    Neuro/Psych    (+)  anxiety/panic attacks,        GI/Hepatic/Renal    (+) liver disease    Endo/Other    (+) diabetes mellitus  Abdominal                  Anesthesia Plan:   ASA:  3  Plan:   General  Informed Consent Plan and Risks Discussed With:  Patient      I have informed April S Prettyman and/or legal guardian or family member of the nature of the anesthetic plan, benefits, risks including possible dental damage if relevant, major complications, and any alternative forms of anesthetic management.   All of the patient's questions were answered to the best of my ability. The patient desires the anesthetic management as planned.  Rubén Macdonald MD  1/5/2024 12:22 PM  Present on Admission:  **None**

## 2024-01-05 NOTE — OPERATIVE REPORT
Operative Note    Patient Name: April S [facility]    Date of Surgery: 01/05/24     Preoperative Diagnosis: Rectal polyp    Postoperative Diagnosis: same    Primary Surgeon: Manny Pereira MD    Assistant: PRISCA HERNANDEZ    Procedures: ***    Surgical Findings: ***tight anal canal limiting exam to medium angled Fall, subtle flat polyp right posteiro quadrtant, biopsied and the destroyed with cautery,1 x 3cm area, <25% circumference    Anesthesia: General    Complications: none    Implants: ***    Specimen: ***    Drains: ***    Condition: ***    Estimated Blood Loss: 2 mL      DESCRIPTION OF PROCEDURE    INDICATION   The patient is a *** year old ***male with a rectal polyp *** cm from anal verge. Endoscopic ultrasound indicated this was either ***uT0 or ***uT1. Options were discussed with h*** and ***he was advised to undergo transanal excision of the rectal polyp. The procedure, indications, risks, benefits, and alternatives were discussed with the patient prior to the procedure. All questions were answered, and the patient wished to proceed.     TECHNIQUE  The patient was taken to the operating room and placed supine*** on the operating table. General anesthesia was induced. The patient was then repositioned in lithotomy position using tg Kal*** stirrups with appropriate attention to all joint and pressure points. Timeout was called to reconfirm the patient's identity, diagnosis, planned procedure, and completeness of preoperative preparations. ***Proctoscopy was performed and this confirmed the location of the lesion in the right posterior quadrant at the dentate line. quadrant *** cm from the anal verge. The perianum was prepared with Betadine and draped in the usual sterile fashion.     The procedure began with digital rectal examination, dilating the anus to accept 3 fingers. Cautery was used to honorio out a 1 cm margin around the lesion. Full thickness dissection was carried out within the marked margins using  cautery. Perirectal fat was exposed without noting intraperitoneal contents. The specimen was removed and oriented with sutures. The defect was irrigated with warm saline. Hemostasis was confirmed. The defect was the closed using 2-0 Vicryl interrupted sutures and good apposition of the wound edges was seen along the entire length of the defect.*** The lumen remained patent without stenosis.    The patient was then allowed to emerge from anesthesia without incident. ***he was extubated in the operating room and taken to the recovery room in satisfactory condition. Sponge, needle, and instrument counts were correct at the end of the operation. I was present for the entire procedure.    (Ms. Claudiojerry's*** Mo's*** Juarez's***Novice's***Wascher's*** skilled assistance was necessary for patient positioning, prepping, instrument passing and holding, retraction, suturing, and camera holding.)        _____________________________________   Attending Surgeon: Manny Pereira MD   Dictated By: Manny Pereira MD

## 2024-01-05 NOTE — ANESTHESIA POSTPROCEDURE EVALUATION
Patient: April S Prettyman    Procedure Summary       Date: 01/05/24 Room / Location: [facility] MAIN OR 05 / [facility] MAIN OR    Anesthesia Start: 1307 Anesthesia Stop:     Procedure: Proctoscopy, excision of transanal polyp (Anus) Diagnosis: (Rectal polyp, rectal cancer)    Surgeons: Manny Pereira MD Anesthesiologist: Rubén Macdonald MD    Anesthesia Type: general ASA Status: 3            Anesthesia Type: general    Vitals Value Taken Time   BP 90/69 01/05/24 1430   Temp 97.2 °F (36.2 °C) 01/05/24 1430   Pulse 91 01/05/24 1430   Resp 14 01/05/24 1430   SpO2 94 % 01/05/24 1430   Vitals shown include unfiled device data.    [facility] AN Post Evaluation:   Patient Evaluated in PACU  Patient Participation: complete - patient participated  Level of Consciousness: sleepy but conscious  Pain Score: 2  Pain Management: adequate  Airway Patency:patent  Dental exam unchanged from preop  Yes    Cardiovascular Status: blood pressure returned to baseline  Respiratory Status: nasal cannula  Postoperative Hydration acceptable      Irena Randle CRNA  1/5/2024 2:31 PM

## 2024-01-05 NOTE — DISCHARGE SUMMARY
Outpatient Surgery Brief Discharge Summary         Patient ID:  April S Reina  [id number]  63 year old  2/10/1960    Admission date:  01/05/24    Discharge date: 01/05/24    Discharge Diagnoses: Rectal polyp, ulcerative colitis    Procedures: No discharge procedures on file.    Discharged Condition: stable    Disposition: home    Patient Instructions: Follow-up with Manny Pereira MD in 1-2 weeks.    Diet: regular diet  Activity: as tolerated    Manny Pereira MD, FACS, FASCRS  1/5/2024  2:25 PM

## 2024-01-05 NOTE — H&P
[facility]  Office Visit - Colon and Rectal Surgery  Manny Pereira MD    CHIEF COMPLAINT: Patient presents for surgery for rectal polyp.      HISTORY OF PRESENT ILLNESS:  April Reina is a 63 year old female who presents for surgery for rectal polyp in setting of  ulcerative colitis with several adenomas.    She has been seeing Dr. Rosa, last office visit prior to recent colonoscopy was 01/26/2023 and IBD history summarized as follows...  IBD history:  Diagnosed 1978. treated with steroids and azulfadine but no Rx for many years. pancolitis noted at 2003 colonoscopy per Dr Gross.   2012: transverse colon cancer, T4, N0, M0 signet ring with microsatellite instability. Received adjuvant chemo.  2014: anastomosis at 60cm. Adenomatous polyp at 10cm. Chromoendoscopy 7/15 showed suspicious crypt pattern in rectum but bx hyperplastic.  2/17 chromoendoscopy: hyperplastic polyps  4/18 chromoendoscopy: active colitis, no polyps/lesions  8/18 stopped SSZ due to renal function during episode of kidney stones. Lost insurance so no follow up.  2/20 colonoscopy: Fuchs 2 pancolitis, CD Diff positive. Resume SSZ  2/21 patient stopped all medications.  1/23 resumed SSZ  Since her last visit her symptoms have been under reasonably good control.  She reports no diarrhea, occ bleeding, no more abdominal pain.  Skin lesions/rashes: no  Joint pain/swelling: no  Tobacco/cannabis no  She is being treated with SSZ and folic acid. She has been compliant with the treatment plan. Side effects from medications are not present.    She has been seeing Dr. Rosa for more than 10 years for management of ulcerative colitis.  She reports the colitis has been in remission.  She denies abdominal pain or bloody stools.  An adenoma was identified in the distal rectum at her most recent endoscopic examination, and this is not amenable to endoscopic removal.    Bowel habits:   Frequency: 2-3 times daily  Consistency: Variable, mushy to soft  and formed  Urgency: Denied  Continence: Intact    She was recently started on medication for diabetes and has lost 70 pounds (325 - 255 currently).    HISTORY:  Past Medical History:   Diagnosis Date   Adenomatous polyp of colon 5/13   ALLERGIC RHINITIS   Calculus of kidney   Colitis   Colon cancer (HCC) 1/12   Diabetes (HCC)   High blood pressure   High cholesterol   Neuropathy   OBESITY   OTHER DISEASES   ulcerative colitis in remission   SEASONAL ALLERGIES   Visual impairment     Past Surgical History:   Procedure Laterality Date   APPENDECTOMY   CHOLECYSTECTOMY   COLECTOMY 2012   80% removed   COLONOSCOPY 4/18, 2/17, 7/15, 4/15, 4/14, 12/13, 5/13, 12, 03   chromoendoscopy (7/15, 2/17, 4/18)   COLONOSCOPY N/A 02/03/2017   Procedure: COLONOSCOPY; Surgeon: Obie Rosa MD; Location: [facility] ENDOSCOPY   COLONOSCOPY N/A 04/20/2018   Procedure: COLONOSCOPY; Surgeon: Obie Rosa MD; Location: [facility] ENDOSCOPY   COLONOSCOPY N/A 02/20/2020   Procedure: COLONOSCOPY; Surgeon: Obie Rosa MD; Location: [facility] ENDOSCOPY   COLONOSCOPY 07/2023   D & C   TONSILLECTOMY     Family History   Problem Relation Age of Onset   Diabetes Father   Cancer Mother   breast     Social History  Tobacco Use  Smoking status: Never  Smokeless tobacco: Never  Alcohol use: Yes  Comment: rarely, once a year  Drug use: No      CURRENT MEDICATIONS:   Current Outpatient Medications   Medication Sig Dispense Refill   sulfaSALAzine 500 MG Oral Tab Take 4 tablets (2,000 mg total) by mouth 2 (two) times daily. 240 tablet 5   folic acid 1 MG Oral Tab Take 1 tablet (1 mg total) by mouth daily. 30 tablet 11   MetFORMIN HCl  MG Oral Tablet 24 Hr Take 2 tablets po bid (Patient not taking: Reported on 1/30/2020 ) 360 tablet 4   atorvastatin 10 MG Oral Tab Take 1 tablet po every PM (Patient not taking: Reported on 1/30/2020 ) 90 tablet 3   lisinopril 10 MG Oral Tab Take 1 tablet po daily (Patient not taking: Reported on 1/30/2020 ) 90 tablet 3    Cholecalciferol (VITAMIN D3) 3000 UNITS Oral Tab Take by mouth daily.   aspirin 81 MG Oral Tab Take 81 mg by mouth daily.         ALLERGIES:  Latex and Reglan [Metoclopramide]    REVIEW OF SYSTEMS:  Constitutional: normal  Eyes: normal  Ears/Nose/Throat: Sinus problems  Respiratory: normal  Cardiac/Vascular: normal  GI: See HPI  : Kidney stones, hematuria  Musculoskeletal: Joint pain, arthritis  Skin: normal  Neurologic: normal  Psychiatric: normal  Endocrine: Temperature intolerance  Hematology/Lymphatics: normal  Allergy/Immunology: normal    PHYSICAL EXAM:   Ht 5' 3\" (1.6 m)  Wt 265 lb (120.2 kg)  LMP 06/30/2010  BMI 46.94 kg/m²   Body mass index is 46.94 kg/m².    CONSTITUTIONAL: awake, alert, cooperative, no apparent distress, and appears stated age  EYES: Lids and lashes normal, sclera clear, conjunctiva normal  ENT: Normocephalic, without obvious abnormality, atraumatic  NECK: Supple, symmetrical, trachea midline, no adenopathy, thyroid symmetric, not enlarged and no tenderness  HEMATOLOGIC/LYMPHATICS: No cervical lymphadenopathy, no supraclavicular lymphadenopathy, no inguinal lymphadenopathy  LUNGS: No increased work of breathing, good air exchange, clear to auscultation bilaterally, no crackles or wheezing  CARDIOVASCULAR: Normal apical impulse, regular rate and rhythm, and no murmur noted, no pedal edema  ABDOMEN:   Obese, midline scar poorly visible,   normal bowel sounds, soft, non-distended, non-tender,   no masses palpated, no hepatosplenomegaly    RECTAL:  External skin is normal, without signs of perianal Crohn's disease  Anal tags are minimal  Prolapse is not noted with straining  Resting tone is elevated, 4/5 (normal 3/5), mildly tender  Squeeze tone is normal, 3/5  Mass is not palpable    MUSCULOSKELETAL: Full range of motion noted. Motor strength is 5 out of 5 all extremities bilaterally.   SKIN: no rashes and no jaundice  6 cm lipoma right lateral costal margin  PSYCHIATRIC:    Orientation: normal  Appearance: normal  Behavior: normal  Attitude toward examiner: normal  Affect: normal  Judgment: Normal    DATA:   COLONOSCOPY 7/21/2023 - Dr. SIDDHARTHA Rosa  Pre-op diagnosis: History of chronic pan ulcerative colitis since 1978 and history of colon cancer.  (Last colonoscopy 3 years ago)    Post-op diagnosis: Minimal evidence of colitis, colon polyps    Findings:  The ileal mucosa was normal. . There was an ileocolonic anastomosis at 70 cm. There was minimal evidence of colitis characterized by erythema and loss of vascular pattern with a few pinpoint erosions. Biopsies taken in the proximal and distal colon. At 27 cm there was a lobulated sessile 1 cm polyp which was piecemeal snare extracted and a small adjacent polyp that was cold snare excised. At 23 cm there was a 1 cm polyp was hot snare excised and an adjacent tiny polyp which was cold snare excised. Both these sites at 27 and 23 cm were tattooed. In the distalmost portion of the rectum abutting the dentate line there was some polypoid appearing tissue which was not easily snared and was therefore biopsied for review. There was no evidence of vascular anomalies, diverticulosis or mass lesions. Retroflexed view of the anus revealed internal hemorrhoids. Digital rectal exam was normal.    Final Diagnosis   A. Proximal colon, random biopsies:  -Patchy chronic active colitis with mild activity.  -No granulomas or dysplasia identified.    B. Distal colon, random biopsies:  -Patchy chronic active colitis with mild activity.  -No granulomas or dysplasia identified.    C. Colon polyps at 27 cm, polypectomy:  -Fragments of tubular adenomas.    D. Colon polyps at 23 cm, polypectomy:  -Fragments of tubular adenomas with extensive high-grade dysplasia. (See comment)     E. Distal rectal polyp, polypectomy:  -Superficial fragments of tubulovillous adenoma. (See comment)       ASSESSMENT AND PLAN:    Rectal polyp, adenoma  At dentate line, unable to  remove endoscopically    Colonic polyps, adenomas  Removed endoscopically    History colon cancer  T4N0 transverse colon cancer removed by Dr. Mukherjee 2012    Ulcerative colitis  Diagnosed 1978 (45 year history)  She is treated with sulfasalazine and he is in remission    Obesity  BMI 47     We discussed ulcerative colitis, colon cancer in the setting of ulcerative colitis, and optimal surgical management in the form of  Completion total proctocolectomy, mucosectomy, handsewn J-pouch ileoanal anastomosis, temporary loop ileostomy  vs  Completion total proctocolectomy with end ileostomy    Total proctocolectomy will be complicated by body habitus (BMI 47) which may render the ability to create a pelvic pouch able to reach the dentate line technically impossible for generate substantial ileostomy related complications (stoma recession, stoma ischemia). Although the stoma is anticipated to be temporary, she may elect to have a permanent ileostomy based on bowel function concerns or she may need a permanent ileostomy if a pouch is not able to be mobilized to reach the dentate line.    A third alternative is   -transanal excision of existing distal rectal polyp  -continued weight loss over the next year (she has lost 70 pounds recently with diabetic medication)   -close endoscopic surveillance until she is ready for total proctocolectomy with or without reconstruction.    Above with discussed with Dr. Rosa who concurs and is comfortable with short-term, close endoscopic surveillance after transanal excision and until weight loss is sufficient such that total proctocolectomy is considered less complicated.    Procedure, indications, risks, benefits, and alternatives discussed with patient. All questions answered. She understands and she wishes to proceed.      Manny Pereira MD, MD, FACS, FASCRS

## 2025-02-21 RX ORDER — DAPAGLIFLOZIN 10 MG/1
1 TABLET, FILM COATED ORAL DAILY
Status: ON HOLD | COMMUNITY
End: 2025-03-04

## 2025-02-21 RX ORDER — TIRZEPATIDE 15 MG/.5ML
1 INJECTION, SOLUTION SUBCUTANEOUS DAILY
Status: ON HOLD | COMMUNITY
End: 2025-03-04

## 2025-02-25 ENCOUNTER — OFFICE VISIT (OUTPATIENT)
Dept: WOUND CARE | Facility: HOSPITAL | Age: 65
End: 2025-02-25
Attending: NURSE PRACTITIONER
Payer: COMMERCIAL

## 2025-02-25 ENCOUNTER — LAB ENCOUNTER (OUTPATIENT)
Dept: LAB | Facility: HOSPITAL | Age: 65
End: 2025-02-25
Attending: COLON & RECTAL SURGERY
Payer: COMMERCIAL

## 2025-02-25 VITALS
OXYGEN SATURATION: 96 % | RESPIRATION RATE: 18 BRPM | HEART RATE: 60 BPM | TEMPERATURE: 98 F | SYSTOLIC BLOOD PRESSURE: 123 MMHG | DIASTOLIC BLOOD PRESSURE: 84 MMHG

## 2025-02-25 DIAGNOSIS — Z01.818 PREOP TESTING: ICD-10-CM

## 2025-02-25 DIAGNOSIS — Z71.89 OSTOMY NURSE CONSULTATION: Primary | ICD-10-CM

## 2025-02-25 LAB
ANION GAP SERPL CALC-SCNC: 7 MMOL/L (ref 0–18)
ANTIBODY SCREEN: NEGATIVE
BASOPHILS # BLD AUTO: 0.06 X10(3) UL (ref 0–0.2)
BASOPHILS NFR BLD AUTO: 0.6 %
BUN BLD-MCNC: 25 MG/DL (ref 9–23)
BUN/CREAT SERPL: 18.7 (ref 10–20)
CALCIUM BLD-MCNC: 9.2 MG/DL (ref 8.7–10.4)
CHLORIDE SERPL-SCNC: 108 MMOL/L (ref 98–112)
CO2 SERPL-SCNC: 26 MMOL/L (ref 21–32)
CREAT BLD-MCNC: 1.34 MG/DL
DEPRECATED RDW RBC AUTO: 43.7 FL (ref 35.1–46.3)
EGFRCR SERPLBLD CKD-EPI 2021: 44 ML/MIN/1.73M2 (ref 60–?)
EOSINOPHIL # BLD AUTO: 0.34 X10(3) UL (ref 0–0.7)
EOSINOPHIL NFR BLD AUTO: 3.2 %
ERYTHROCYTE [DISTWIDTH] IN BLOOD BY AUTOMATED COUNT: 13.5 % (ref 11–15)
GLUCOSE BLD-MCNC: 88 MG/DL (ref 70–99)
HCT VFR BLD AUTO: 48.3 %
HGB BLD-MCNC: 15.2 G/DL
IMM GRANULOCYTES # BLD AUTO: 0.04 X10(3) UL (ref 0–1)
IMM GRANULOCYTES NFR BLD: 0.4 %
LYMPHOCYTES # BLD AUTO: 2.58 X10(3) UL (ref 1–4)
LYMPHOCYTES NFR BLD AUTO: 24.1 %
MCH RBC QN AUTO: 27.8 PG (ref 26–34)
MCHC RBC AUTO-ENTMCNC: 31.5 G/DL (ref 31–37)
MCV RBC AUTO: 88.5 FL
MONOCYTES # BLD AUTO: 0.63 X10(3) UL (ref 0.1–1)
MONOCYTES NFR BLD AUTO: 5.9 %
NEUTROPHILS # BLD AUTO: 7.07 X10 (3) UL (ref 1.5–7.7)
NEUTROPHILS # BLD AUTO: 7.07 X10(3) UL (ref 1.5–7.7)
NEUTROPHILS NFR BLD AUTO: 65.8 %
OSMOLALITY SERPL CALC.SUM OF ELEC: 296 MOSM/KG (ref 275–295)
PLATELET # BLD AUTO: 290 10(3)UL (ref 150–450)
POTASSIUM SERPL-SCNC: 4.4 MMOL/L (ref 3.5–5.1)
Q-T INTERVAL: 396 MS
QRS DURATION: 58 MS
QTC CALCULATION (BEZET): 538 MS
R AXIS: 126 DEGREES
RBC # BLD AUTO: 5.46 X10(6)UL
RH BLOOD TYPE: POSITIVE
SODIUM SERPL-SCNC: 141 MMOL/L (ref 136–145)
T AXIS: 150 DEGREES
VENTRICULAR RATE: 111 BPM
WBC # BLD AUTO: 10.7 X10(3) UL (ref 4–11)

## 2025-02-25 PROCEDURE — 86900 BLOOD TYPING SEROLOGIC ABO: CPT

## 2025-02-25 PROCEDURE — 85025 COMPLETE CBC W/AUTO DIFF WBC: CPT

## 2025-02-25 PROCEDURE — 93005 ELECTROCARDIOGRAM TRACING: CPT

## 2025-02-25 PROCEDURE — 36415 COLL VENOUS BLD VENIPUNCTURE: CPT

## 2025-02-25 PROCEDURE — 80048 BASIC METABOLIC PNL TOTAL CA: CPT

## 2025-02-25 PROCEDURE — 93010 ELECTROCARDIOGRAM REPORT: CPT | Performed by: INTERNAL MEDICINE

## 2025-02-25 PROCEDURE — 86901 BLOOD TYPING SEROLOGIC RH(D): CPT

## 2025-02-25 PROCEDURE — 86850 RBC ANTIBODY SCREEN: CPT

## 2025-02-25 NOTE — PROGRESS NOTES
Patient seen today for ostomy marking. Plan for Proctoscopy, open total proctocolectomy with end ileosto  surgery with Dr. Pereira on 2/27/25. Patient assessed in laying, sitting, and standing positions for optimal marking site. Stoma marking made in RUQ abdomen as lower quadrants were below patient's line of sight. Marking was made within rectus muscle avoiding belt line, away from umbilicus and creases/folds. Patient visualized stoma marking. Marking made with a black marker and not secured with Tegaderm since patient is sensitive to transparent tape. Discussed general ostomy care and expectations for post-op inpatient ostomy teaching; all questions answered to best of my ability.

## 2025-02-26 ENCOUNTER — ANESTHESIA EVENT (OUTPATIENT)
Dept: SURGERY | Facility: HOSPITAL | Age: 65
End: 2025-02-26
Payer: COMMERCIAL

## 2025-02-26 RX ORDER — ACETAMINOPHEN 10 MG/ML
1000 INJECTION, SOLUTION INTRAVENOUS ONCE
Status: COMPLETED | OUTPATIENT
Start: 2025-02-26 | End: 2025-02-27

## 2025-02-26 RX ORDER — ACETAMINOPHEN 500 MG
1000 TABLET ORAL ONCE
Status: DISCONTINUED | OUTPATIENT
Start: 2025-02-26 | End: 2025-02-26

## 2025-02-26 NOTE — PAT NURSING NOTE
S/w Dr Georges on call Anesthesiologist today re: MD reviewed abn EKG done 2/25 in [facility]  per Dr Pereira's  request. Per Dr Georges recommends pt to see a Cardiologist before surgery tomorrow.  S/w Xin from Dr Pereira's ofc and aware and she will inform MD. And will update PAT.

## 2025-02-27 ENCOUNTER — APPOINTMENT (OUTPATIENT)
Dept: GENERAL RADIOLOGY | Facility: HOSPITAL | Age: 65
DRG: 330 | End: 2025-02-27
Attending: COLON & RECTAL SURGERY
Payer: COMMERCIAL

## 2025-02-27 ENCOUNTER — ANESTHESIA (OUTPATIENT)
Dept: SURGERY | Facility: HOSPITAL | Age: 65
End: 2025-02-27
Payer: COMMERCIAL

## 2025-02-27 ENCOUNTER — HOSPITAL ENCOUNTER (INPATIENT)
Facility: HOSPITAL | Age: 65
LOS: 5 days | Discharge: HOME | DRG: 330 | End: 2025-03-04
Attending: COLON & RECTAL SURGERY | Admitting: COLON & RECTAL SURGERY
Payer: COMMERCIAL

## 2025-02-27 DIAGNOSIS — K62.1 RECTAL POLYP: ICD-10-CM

## 2025-02-27 DIAGNOSIS — Z01.818 PREOP TESTING: Primary | ICD-10-CM

## 2025-02-27 LAB
BASE EXCESS BLD CALC-SCNC: -6.4 MMOL/L (ref ?–2)
BASE EXCESS BLD CALC-SCNC: -8.2 MMOL/L (ref ?–2)
CA-I BLD-SCNC: 1.06 MMOL/L (ref 0.95–1.32)
CA-I BLD-SCNC: 1.11 MMOL/L (ref 0.95–1.32)
COHGB MFR BLD: 1.9 % (ref 0–3)
COHGB MFR BLD: 2.3 % (ref 0–3)
EST. AVERAGE GLUCOSE BLD GHB EST-MCNC: 111 MG/DL (ref 68–126)
GLUCOSE BLDC GLUCOMTR-MCNC: 111 MG/DL (ref 70–99)
GLUCOSE BLDC GLUCOMTR-MCNC: 129 MG/DL (ref 70–99)
GLUCOSE BLDC GLUCOMTR-MCNC: 134 MG/DL (ref 70–99)
GLUCOSE BLDC GLUCOMTR-MCNC: 143 MG/DL (ref 70–99)
HBA1C MFR BLD: 5.5 % (ref ?–5.7)
HCO3 BLDA-SCNC: 18.5 MEQ/L (ref 21–27)
HCO3 BLDA-SCNC: 19.9 MEQ/L (ref 21–27)
HGB BLD-MCNC: 14.1 G/DL
HGB BLD-MCNC: 15.3 G/DL
LACTATE BLD-SCNC: 0.9 MMOL/L (ref 0.5–2)
LACTATE BLD-SCNC: 3.4 MMOL/L (ref 0.5–2)
METHGB MFR BLD: 1.3 % SAT (ref 0.4–1.5)
METHGB MFR BLD: 1.5 % SAT (ref 0.4–1.5)
O2 CT BLD-SCNC: 19.2 VOL% (ref 15–23)
O2 CT BLD-SCNC: 20.6 VOL% (ref 15–23)
PCO2 BLDA: 37 MM HG (ref 35–45)
PCO2 BLDA: 38 MM HG (ref 35–45)
PH BLDA: 7.28 [PH] (ref 7.35–7.45)
PH BLDA: 7.32 [PH] (ref 7.35–7.45)
PO2 BLDA: 115 MM HG (ref 80–100)
PO2 BLDA: 126 MM HG (ref 80–100)
POTASSIUM BLD-SCNC: 3 MMOL/L (ref 3.6–5.1)
POTASSIUM BLD-SCNC: 3.6 MMOL/L (ref 3.6–5.1)
PUNCTURE CHARGE: NO
PUNCTURE CHARGE: NO
RH BLOOD TYPE: POSITIVE
SAO2 % BLDA: >99 % (ref 94–100)
SAO2 % BLDA: >99 % (ref 94–100)
SODIUM BLD-SCNC: 136 MMOL/L (ref 135–145)
SODIUM BLD-SCNC: 138 MMOL/L (ref 135–145)

## 2025-02-27 PROCEDURE — 99222 1ST HOSP IP/OBS MODERATE 55: CPT | Performed by: HOSPITALIST

## 2025-02-27 PROCEDURE — 0DNW0ZZ RELEASE PERITONEUM, OPEN APPROACH: ICD-10-PCS | Performed by: COLON & RECTAL SURGERY

## 2025-02-27 PROCEDURE — 0DTE0ZZ RESECTION OF LARGE INTESTINE, OPEN APPROACH: ICD-10-PCS | Performed by: COLON & RECTAL SURGERY

## 2025-02-27 PROCEDURE — 0DTP0ZZ RESECTION OF RECTUM, OPEN APPROACH: ICD-10-PCS | Performed by: COLON & RECTAL SURGERY

## 2025-02-27 PROCEDURE — 0D1B0Z4 BYPASS ILEUM TO CUTANEOUS, OPEN APPROACH: ICD-10-PCS | Performed by: COLON & RECTAL SURGERY

## 2025-02-27 PROCEDURE — 76942 ECHO GUIDE FOR BIOPSY: CPT | Performed by: ANESTHESIOLOGY

## 2025-02-27 PROCEDURE — 3E0T3BZ INTRODUCTION OF ANESTHETIC AGENT INTO PERIPHERAL NERVES AND PLEXI, PERCUTANEOUS APPROACH: ICD-10-PCS | Performed by: ANESTHESIOLOGY

## 2025-02-27 PROCEDURE — 74018 RADEX ABDOMEN 1 VIEW: CPT | Performed by: COLON & RECTAL SURGERY

## 2025-02-27 RX ORDER — ONDANSETRON 2 MG/ML
4 INJECTION INTRAMUSCULAR; INTRAVENOUS EVERY 6 HOURS PRN
Status: DISCONTINUED | OUTPATIENT
Start: 2025-02-27 | End: 2025-02-27 | Stop reason: HOSPADM

## 2025-02-27 RX ORDER — MORPHINE SULFATE 10 MG/ML
6 INJECTION, SOLUTION INTRAMUSCULAR; INTRAVENOUS EVERY 10 MIN PRN
Status: CANCELLED | OUTPATIENT
Start: 2025-02-27

## 2025-02-27 RX ORDER — EPHEDRINE SULFATE 50 MG/ML
INJECTION, SOLUTION INTRAVENOUS AS NEEDED
Status: DISCONTINUED | OUTPATIENT
Start: 2025-02-27 | End: 2025-02-27 | Stop reason: SURG

## 2025-02-27 RX ORDER — HEPARIN SODIUM 5000 [USP'U]/ML
5000 INJECTION, SOLUTION INTRAVENOUS; SUBCUTANEOUS EVERY 8 HOURS SCHEDULED
Status: DISCONTINUED | OUTPATIENT
Start: 2025-02-28 | End: 2025-03-04

## 2025-02-27 RX ORDER — FAMOTIDINE 10 MG/ML
20 INJECTION, SOLUTION INTRAVENOUS DAILY
Status: DISCONTINUED | OUTPATIENT
Start: 2025-02-28 | End: 2025-03-04

## 2025-02-27 RX ORDER — OXYCODONE HYDROCHLORIDE 5 MG/1
2.5 TABLET ORAL EVERY 4 HOURS PRN
Status: DISCONTINUED | OUTPATIENT
Start: 2025-02-27 | End: 2025-03-02

## 2025-02-27 RX ORDER — METRONIDAZOLE 500 MG/100ML
500 INJECTION, SOLUTION INTRAVENOUS EVERY 8 HOURS
Status: DISCONTINUED | OUTPATIENT
Start: 2025-02-27 | End: 2025-02-27 | Stop reason: HOSPADM

## 2025-02-27 RX ORDER — HYDROMORPHONE HYDROCHLORIDE 1 MG/ML
0.2 INJECTION, SOLUTION INTRAMUSCULAR; INTRAVENOUS; SUBCUTANEOUS EVERY 2 HOUR PRN
Status: DISCONTINUED | OUTPATIENT
Start: 2025-02-27 | End: 2025-03-04

## 2025-02-27 RX ORDER — MORPHINE SULFATE 4 MG/ML
4 INJECTION, SOLUTION INTRAMUSCULAR; INTRAVENOUS EVERY 10 MIN PRN
Status: DISCONTINUED | OUTPATIENT
Start: 2025-02-27 | End: 2025-02-27 | Stop reason: HOSPADM

## 2025-02-27 RX ORDER — PROCHLORPERAZINE EDISYLATE 5 MG/ML
10 INJECTION INTRAMUSCULAR; INTRAVENOUS EVERY 6 HOURS PRN
Status: DISCONTINUED | OUTPATIENT
Start: 2025-02-27 | End: 2025-03-04

## 2025-02-27 RX ORDER — SODIUM CHLORIDE, SODIUM LACTATE, POTASSIUM CHLORIDE, CALCIUM CHLORIDE 600; 310; 30; 20 MG/100ML; MG/100ML; MG/100ML; MG/100ML
INJECTION, SOLUTION INTRAVENOUS CONTINUOUS
Status: DISCONTINUED | OUTPATIENT
Start: 2025-02-27 | End: 2025-02-27 | Stop reason: ALTCHOICE

## 2025-02-27 RX ORDER — ACETAMINOPHEN 500 MG
1000 TABLET ORAL ONCE AS NEEDED
Status: DISCONTINUED | OUTPATIENT
Start: 2025-02-27 | End: 2025-02-27 | Stop reason: HOSPADM

## 2025-02-27 RX ORDER — SODIUM CHLORIDE, SODIUM LACTATE, POTASSIUM CHLORIDE, CALCIUM CHLORIDE 600; 310; 30; 20 MG/100ML; MG/100ML; MG/100ML; MG/100ML
2 INJECTION, SOLUTION INTRAVENOUS CONTINUOUS
Status: DISCONTINUED | OUTPATIENT
Start: 2025-02-27 | End: 2025-03-02

## 2025-02-27 RX ORDER — HYDROMORPHONE HYDROCHLORIDE 1 MG/ML
0.4 INJECTION, SOLUTION INTRAMUSCULAR; INTRAVENOUS; SUBCUTANEOUS EVERY 5 MIN PRN
Status: CANCELLED | OUTPATIENT
Start: 2025-02-27

## 2025-02-27 RX ORDER — DEXAMETHASONE SODIUM PHOSPHATE 4 MG/ML
VIAL (ML) INJECTION AS NEEDED
Status: DISCONTINUED | OUTPATIENT
Start: 2025-02-27 | End: 2025-02-27 | Stop reason: SURG

## 2025-02-27 RX ORDER — HYDROMORPHONE HYDROCHLORIDE 1 MG/ML
0.4 INJECTION, SOLUTION INTRAMUSCULAR; INTRAVENOUS; SUBCUTANEOUS EVERY 2 HOUR PRN
Status: DISCONTINUED | OUTPATIENT
Start: 2025-02-27 | End: 2025-03-04

## 2025-02-27 RX ORDER — HYDROMORPHONE HYDROCHLORIDE 1 MG/ML
0.6 INJECTION, SOLUTION INTRAMUSCULAR; INTRAVENOUS; SUBCUTANEOUS EVERY 5 MIN PRN
Status: DISCONTINUED | OUTPATIENT
Start: 2025-02-27 | End: 2025-02-27 | Stop reason: HOSPADM

## 2025-02-27 RX ORDER — OXYCODONE HYDROCHLORIDE 5 MG/1
5 TABLET ORAL EVERY 4 HOURS PRN
Status: DISCONTINUED | OUTPATIENT
Start: 2025-02-27 | End: 2025-03-02

## 2025-02-27 RX ORDER — METOCLOPRAMIDE HYDROCHLORIDE 5 MG/ML
5 INJECTION INTRAMUSCULAR; INTRAVENOUS EVERY 8 HOURS PRN
Status: DISCONTINUED | OUTPATIENT
Start: 2025-02-27 | End: 2025-02-27 | Stop reason: HOSPADM

## 2025-02-27 RX ORDER — FAMOTIDINE 20 MG/1
20 TABLET, FILM COATED ORAL ONCE
Status: COMPLETED | OUTPATIENT
Start: 2025-02-27 | End: 2025-02-27

## 2025-02-27 RX ORDER — BUPIVACAINE HYDROCHLORIDE 2.5 MG/ML
INJECTION, SOLUTION EPIDURAL; INFILTRATION; INTRACAUDAL; PERINEURAL AS NEEDED
Status: DISCONTINUED | OUTPATIENT
Start: 2025-02-27 | End: 2025-02-27 | Stop reason: HOSPADM

## 2025-02-27 RX ORDER — SODIUM CHLORIDE, SODIUM LACTATE, POTASSIUM CHLORIDE, CALCIUM CHLORIDE 600; 310; 30; 20 MG/100ML; MG/100ML; MG/100ML; MG/100ML
INJECTION, SOLUTION INTRAVENOUS CONTINUOUS
Status: CANCELLED | OUTPATIENT
Start: 2025-02-27

## 2025-02-27 RX ORDER — POLYETHYLENE GLYCOL 3350 17 G/17G
17 POWDER, FOR SOLUTION ORAL DAILY PRN
Status: DISCONTINUED | OUTPATIENT
Start: 2025-02-27 | End: 2025-03-04

## 2025-02-27 RX ORDER — SODIUM CHLORIDE 9 MG/ML
INJECTION, SOLUTION INTRAVENOUS CONTINUOUS PRN
Status: DISCONTINUED | OUTPATIENT
Start: 2025-02-27 | End: 2025-02-27 | Stop reason: SURG

## 2025-02-27 RX ORDER — FAMOTIDINE 20 MG/1
20 TABLET, FILM COATED ORAL DAILY
Status: DISCONTINUED | OUTPATIENT
Start: 2025-02-28 | End: 2025-03-04

## 2025-02-27 RX ORDER — SENNOSIDES 8.6 MG
17.2 TABLET ORAL NIGHTLY PRN
Status: DISCONTINUED | OUTPATIENT
Start: 2025-02-27 | End: 2025-03-04

## 2025-02-27 RX ORDER — NICOTINE POLACRILEX 4 MG
15 LOZENGE BUCCAL
Status: DISCONTINUED | OUTPATIENT
Start: 2025-02-27 | End: 2025-02-27 | Stop reason: HOSPADM

## 2025-02-27 RX ORDER — MORPHINE SULFATE 4 MG/ML
2 INJECTION, SOLUTION INTRAMUSCULAR; INTRAVENOUS EVERY 10 MIN PRN
Status: DISCONTINUED | OUTPATIENT
Start: 2025-02-27 | End: 2025-02-27 | Stop reason: HOSPADM

## 2025-02-27 RX ORDER — LIDOCAINE HYDROCHLORIDE 10 MG/ML
INJECTION, SOLUTION EPIDURAL; INFILTRATION; INTRACAUDAL; PERINEURAL AS NEEDED
Status: DISCONTINUED | OUTPATIENT
Start: 2025-02-27 | End: 2025-02-27 | Stop reason: SURG

## 2025-02-27 RX ORDER — ROCURONIUM BROMIDE 10 MG/ML
INJECTION, SOLUTION INTRAVENOUS AS NEEDED
Status: DISCONTINUED | OUTPATIENT
Start: 2025-02-27 | End: 2025-02-27 | Stop reason: SURG

## 2025-02-27 RX ORDER — ONDANSETRON 2 MG/ML
INJECTION INTRAMUSCULAR; INTRAVENOUS AS NEEDED
Status: DISCONTINUED | OUTPATIENT
Start: 2025-02-27 | End: 2025-02-27 | Stop reason: SURG

## 2025-02-27 RX ORDER — SODIUM CHLORIDE, SODIUM LACTATE, POTASSIUM CHLORIDE, CALCIUM CHLORIDE 600; 310; 30; 20 MG/100ML; MG/100ML; MG/100ML; MG/100ML
INJECTION, SOLUTION INTRAVENOUS CONTINUOUS
Status: DISCONTINUED | OUTPATIENT
Start: 2025-02-27 | End: 2025-02-27 | Stop reason: HOSPADM

## 2025-02-27 RX ORDER — DEXTROSE MONOHYDRATE 25 G/50ML
50 INJECTION, SOLUTION INTRAVENOUS
Status: DISCONTINUED | OUTPATIENT
Start: 2025-02-27 | End: 2025-02-27 | Stop reason: HOSPADM

## 2025-02-27 RX ORDER — METRONIDAZOLE 500 MG/100ML
500 INJECTION, SOLUTION INTRAVENOUS EVERY 8 HOURS
Status: COMPLETED | OUTPATIENT
Start: 2025-02-28 | End: 2025-02-28

## 2025-02-27 RX ORDER — MIDAZOLAM HYDROCHLORIDE 1 MG/ML
INJECTION INTRAMUSCULAR; INTRAVENOUS AS NEEDED
Status: DISCONTINUED | OUTPATIENT
Start: 2025-02-27 | End: 2025-02-27 | Stop reason: SURG

## 2025-02-27 RX ORDER — HEPARIN SODIUM 5000 [USP'U]/ML
5000 INJECTION, SOLUTION INTRAVENOUS; SUBCUTANEOUS ONCE
Status: COMPLETED | OUTPATIENT
Start: 2025-02-27 | End: 2025-02-27

## 2025-02-27 RX ORDER — MORPHINE SULFATE 10 MG/ML
6 INJECTION, SOLUTION INTRAMUSCULAR; INTRAVENOUS EVERY 10 MIN PRN
Status: DISCONTINUED | OUTPATIENT
Start: 2025-02-27 | End: 2025-02-27 | Stop reason: HOSPADM

## 2025-02-27 RX ORDER — GLYCOPYRROLATE 0.2 MG/ML
INJECTION, SOLUTION INTRAMUSCULAR; INTRAVENOUS AS NEEDED
Status: DISCONTINUED | OUTPATIENT
Start: 2025-02-27 | End: 2025-02-27 | Stop reason: SURG

## 2025-02-27 RX ORDER — NICOTINE POLACRILEX 4 MG
30 LOZENGE BUCCAL
Status: DISCONTINUED | OUTPATIENT
Start: 2025-02-27 | End: 2025-02-27 | Stop reason: HOSPADM

## 2025-02-27 RX ORDER — HYDROMORPHONE HYDROCHLORIDE 1 MG/ML
0.2 INJECTION, SOLUTION INTRAMUSCULAR; INTRAVENOUS; SUBCUTANEOUS EVERY 5 MIN PRN
Status: DISCONTINUED | OUTPATIENT
Start: 2025-02-27 | End: 2025-02-27 | Stop reason: HOSPADM

## 2025-02-27 RX ORDER — HYDROMORPHONE HYDROCHLORIDE 1 MG/ML
0.4 INJECTION, SOLUTION INTRAMUSCULAR; INTRAVENOUS; SUBCUTANEOUS EVERY 5 MIN PRN
Status: DISCONTINUED | OUTPATIENT
Start: 2025-02-27 | End: 2025-02-27 | Stop reason: HOSPADM

## 2025-02-27 RX ORDER — HYDROMORPHONE HYDROCHLORIDE 1 MG/ML
0.6 INJECTION, SOLUTION INTRAMUSCULAR; INTRAVENOUS; SUBCUTANEOUS EVERY 5 MIN PRN
Status: CANCELLED | OUTPATIENT
Start: 2025-02-27

## 2025-02-27 RX ORDER — SODIUM CHLORIDE 9 MG/ML
INJECTION, SOLUTION INTRAVENOUS CONTINUOUS PRN
Status: DISCONTINUED | OUTPATIENT
Start: 2025-02-27 | End: 2025-02-27 | Stop reason: HOSPADM

## 2025-02-27 RX ORDER — ONDANSETRON 2 MG/ML
4 INJECTION INTRAMUSCULAR; INTRAVENOUS ONCE
Status: COMPLETED | OUTPATIENT
Start: 2025-02-27 | End: 2025-02-27

## 2025-02-27 RX ORDER — FAMOTIDINE 10 MG/ML
20 INJECTION, SOLUTION INTRAVENOUS ONCE
Status: COMPLETED | OUTPATIENT
Start: 2025-02-27 | End: 2025-02-27

## 2025-02-27 RX ORDER — METRONIDAZOLE 500 MG/100ML
500 INJECTION, SOLUTION INTRAVENOUS ONCE
Status: COMPLETED | OUTPATIENT
Start: 2025-02-27 | End: 2025-02-27

## 2025-02-27 RX ORDER — ONDANSETRON 2 MG/ML
4 INJECTION INTRAMUSCULAR; INTRAVENOUS EVERY 6 HOURS PRN
Status: DISCONTINUED | OUTPATIENT
Start: 2025-02-27 | End: 2025-03-04

## 2025-02-27 RX ORDER — HYDROMORPHONE HYDROCHLORIDE 1 MG/ML
0.2 INJECTION, SOLUTION INTRAMUSCULAR; INTRAVENOUS; SUBCUTANEOUS EVERY 5 MIN PRN
Status: CANCELLED | OUTPATIENT
Start: 2025-02-27

## 2025-02-27 RX ORDER — MORPHINE SULFATE 4 MG/ML
4 INJECTION, SOLUTION INTRAMUSCULAR; INTRAVENOUS EVERY 10 MIN PRN
Status: CANCELLED | OUTPATIENT
Start: 2025-02-27

## 2025-02-27 RX ORDER — NALOXONE HYDROCHLORIDE 0.4 MG/ML
0.08 INJECTION, SOLUTION INTRAMUSCULAR; INTRAVENOUS; SUBCUTANEOUS AS NEEDED
Status: CANCELLED | OUTPATIENT
Start: 2025-02-27 | End: 2025-02-28

## 2025-02-27 RX ORDER — MAGNESIUM OXIDE 400 MG/1
400 TABLET ORAL DAILY
Status: DISCONTINUED | OUTPATIENT
Start: 2025-02-27 | End: 2025-03-02

## 2025-02-27 RX ORDER — PHENYLEPHRINE HCL 10 MG/ML
VIAL (ML) INJECTION AS NEEDED
Status: DISCONTINUED | OUTPATIENT
Start: 2025-02-27 | End: 2025-02-27 | Stop reason: SURG

## 2025-02-27 RX ORDER — MORPHINE SULFATE 4 MG/ML
2 INJECTION, SOLUTION INTRAMUSCULAR; INTRAVENOUS EVERY 10 MIN PRN
Status: CANCELLED | OUTPATIENT
Start: 2025-02-27

## 2025-02-27 RX ORDER — NALOXONE HYDROCHLORIDE 0.4 MG/ML
80 INJECTION, SOLUTION INTRAMUSCULAR; INTRAVENOUS; SUBCUTANEOUS AS NEEDED
Status: DISCONTINUED | OUTPATIENT
Start: 2025-02-27 | End: 2025-02-27 | Stop reason: HOSPADM

## 2025-02-27 RX ADMIN — SODIUM CHLORIDE: 9 INJECTION, SOLUTION INTRAVENOUS at 09:45:00

## 2025-02-27 RX ADMIN — SODIUM CHLORIDE: 9 INJECTION, SOLUTION INTRAVENOUS at 08:11:00

## 2025-02-27 RX ADMIN — ROCURONIUM BROMIDE 20 MG: 10 INJECTION, SOLUTION INTRAVENOUS at 11:53:00

## 2025-02-27 RX ADMIN — Medication 25 ML: at 15:00:00

## 2025-02-27 RX ADMIN — GLYCOPYRROLATE 0.2 MG: 0.2 INJECTION, SOLUTION INTRAMUSCULAR; INTRAVENOUS at 07:43:00

## 2025-02-27 RX ADMIN — ACETAMINOPHEN 1000 MG: 10 INJECTION, SOLUTION INTRAVENOUS at 08:01:00

## 2025-02-27 RX ADMIN — LIDOCAINE HYDROCHLORIDE 50 MG: 10 INJECTION, SOLUTION EPIDURAL; INFILTRATION; INTRACAUDAL; PERINEURAL at 07:50:00

## 2025-02-27 RX ADMIN — ROCURONIUM BROMIDE 20 MG: 10 INJECTION, SOLUTION INTRAVENOUS at 10:26:00

## 2025-02-27 RX ADMIN — EPHEDRINE SULFATE 10 MG: 50 INJECTION, SOLUTION INTRAVENOUS at 08:23:00

## 2025-02-27 RX ADMIN — PHENYLEPHRINE HCL 100 MCG: 10 MG/ML VIAL (ML) INJECTION at 08:30:00

## 2025-02-27 RX ADMIN — PHENYLEPHRINE HCL 100 MCG: 10 MG/ML VIAL (ML) INJECTION at 08:36:00

## 2025-02-27 RX ADMIN — ROCURONIUM BROMIDE 50 MG: 10 INJECTION, SOLUTION INTRAVENOUS at 07:59:00

## 2025-02-27 RX ADMIN — ROCURONIUM BROMIDE 20 MG: 10 INJECTION, SOLUTION INTRAVENOUS at 09:39:00

## 2025-02-27 RX ADMIN — PHENYLEPHRINE HCL 100 MCG: 10 MG/ML VIAL (ML) INJECTION at 07:59:00

## 2025-02-27 RX ADMIN — ROCURONIUM BROMIDE 20 MG: 10 INJECTION, SOLUTION INTRAVENOUS at 14:20:00

## 2025-02-27 RX ADMIN — PHENYLEPHRINE HCL 100 MCG: 10 MG/ML VIAL (ML) INJECTION at 07:56:00

## 2025-02-27 RX ADMIN — ONDANSETRON 8 MG: 2 INJECTION INTRAMUSCULAR; INTRAVENOUS at 16:16:00

## 2025-02-27 RX ADMIN — PHENYLEPHRINE HCL 100 MCG: 10 MG/ML VIAL (ML) INJECTION at 09:06:00

## 2025-02-27 RX ADMIN — PHENYLEPHRINE HCL 100 MCG: 10 MG/ML VIAL (ML) INJECTION at 14:09:00

## 2025-02-27 RX ADMIN — SODIUM CHLORIDE, SODIUM LACTATE, POTASSIUM CHLORIDE, CALCIUM CHLORIDE: 600; 310; 30; 20 INJECTION, SOLUTION INTRAVENOUS at 08:36:00

## 2025-02-27 RX ADMIN — MIDAZOLAM HYDROCHLORIDE 2 MG: 1 INJECTION INTRAMUSCULAR; INTRAVENOUS at 07:43:00

## 2025-02-27 RX ADMIN — METRONIDAZOLE 500 MG: 500 INJECTION, SOLUTION INTRAVENOUS at 07:59:00

## 2025-02-27 RX ADMIN — DEXAMETHASONE SODIUM PHOSPHATE 8 MG: 4 MG/ML VIAL (ML) INJECTION at 07:59:00

## 2025-02-27 RX ADMIN — SODIUM CHLORIDE, SODIUM LACTATE, POTASSIUM CHLORIDE, CALCIUM CHLORIDE: 600; 310; 30; 20 INJECTION, SOLUTION INTRAVENOUS at 07:45:00

## 2025-02-27 NOTE — ANESTHESIA PREPROCEDURE EVALUATION
Anesthesia PreOp Note    HPI:     April S Reina is a 65 year old female who presents for preoperative consultation requested by: Manny Pereira MD    Date of Surgery: 2/27/2025    Procedure(s):  Proctoscopy, open total proctocolectomy with end ileostomy  Indication: Ulcerative colitis    Relevant Problems   No relevant active problems       NPO:                         History Review:  Patient Active Problem List    Diagnosis Date Noted   • Ostomy nurse consultation 02/25/2025   • History of adenomatous polyp of colon 01/30/2020   • Vitamin D deficiency 11/20/2017   • History of colon cancer 09/14/2016   • Ulcerative pancolitis with complication (HCC) 09/14/2016   • Abscess, axilla 11/15/2011   • Allergic rhinitis, cause unspecified 04/28/2011   • Asthma (HCC) 04/28/2011   • Cervical polyp 09/15/2010   • Obese 09/15/2010       Past Medical History:   • Adenomatous polyp of colon   • ALLERGIC RHINITIS   • Anxiety state   • Calculus of kidney   • Colitis   • Colon cancer (HCC)   • Diabetes (HCC)   • Disorder of liver    fatty liver   • High blood pressure    patient denies, states her BB is low   • IBS (irritable bowel syndrome)   • Neuropathy   • OBESITY   • OTHER DISEASES    ulcerative colitis in remission   • Personal history of antineoplastic chemotherapy   • PONV (postoperative nausea and vomiting)   • SEASONAL ALLERGIES   • Visual impairment    glasses       Past Surgical History:   Procedure Laterality Date   • Appendectomy     • Cholecystectomy     • Colectomy  2012    80% removed   • Colonoscopy  4/18, 2/17, 7/15, 4/15,  4/14, 12/13, 5/13, 12, 03    chromoendoscopy (7/15, 2/17, 4/18)   • Colonoscopy N/A 02/03/2017    Procedure: COLONOSCOPY;  Surgeon: Obie Rosa MD;  Location: [facility] ENDOSCOPY   • Colonoscopy N/A 04/20/2018    Procedure: COLONOSCOPY;  Surgeon: Obie Rosa MD;  Location: [facility] ENDOSCOPY   • Colonoscopy  02/2020   • Colonoscopy N/A 02/20/2020    Procedure: COLONOSCOPY;  Surgeon: Moe  Obie GARCIA MD;  Location: [facility] ENDOSCOPY   • D & c     • Removal of kidney stone      x2   • Tonsillectomy         Prescriptions Prior to Admission[1]  Current Medications and Prescriptions Ordered in Epic[2]    Allergies[3]    Family History   Problem Relation Age of Onset   • Diabetes Father    • Cancer Mother         breast     Social History     Socioeconomic History   • Marital status:    Tobacco Use   • Smoking status: Never   • Smokeless tobacco: Never   Vaping Use   • Vaping status: Never Used   Substance and Sexual Activity   • Alcohol use: Not Currently   • Drug use: No       Available pre-op labs reviewed.  Lab Results   Component Value Date    WBC 10.7 02/25/2025    RBC 5.46 (H) 02/25/2025    HGB 15.2 02/25/2025    HCT 48.3 (H) 02/25/2025    MCV 88.5 02/25/2025    MCH 27.8 02/25/2025    MCHC 31.5 02/25/2025    RDW 13.5 02/25/2025    .0 02/25/2025     Lab Results   Component Value Date     02/25/2025    K 4.4 02/25/2025     02/25/2025    CO2 26.0 02/25/2025    BUN 25 (H) 02/25/2025    CREATSERUM 1.34 (H) 02/25/2025    GLU 88 02/25/2025    CA 9.2 02/25/2025          Vital Signs:  Body mass index is 41.63 kg/m².   height is 1.6 m (5' 3\") and weight is 106.6 kg (235 lb).   Vitals:    02/21/25 0819   Weight: 106.6 kg (235 lb)   Height: 1.6 m (5' 3\")        Anesthesia Evaluation      History of anesthetic complications   Airway   Mallampati: II  TM distance: >3 FB  Neck ROM: full  Dental - Dentition appears grossly intact     Pulmonary - normal exam   (+) asthma  Cardiovascular - normal exam  (+) hypertension    ROS comment: Abnormal EKG reviewed by cardiology yesterday   Echo post op no symptoms sob cp     Neuro/Psych    (+)  anxiety/panic attacks,        GI/Hepatic/Renal    (+) liver disease    Endo/Other    (+) diabetes mellitus  Abdominal  - normal exam               Anesthesia Plan:   ASA:  3  Plan:   General  Monitors and Lines:   Additonal IV  Airway:  ETT  Post-op Pain  Management: IV analgesics  Plan Comments: Cardiology note reviewed   Informed Consent Plan and Risks Discussed With:  Patient  Use of Blood Products Discussed With:  Patient    I have informed April S Prettyman and/or legal guardian or family member of the nature of the anesthetic plan, benefits, risks including possible dental damage if relevant, major complications, and any alternative forms of anesthetic management.   All of the patient's questions were answered to the best of my ability. The patient desires the anesthetic management as planned.  REJI SONI MD  2/26/2025 8:00 PM  Present on Admission:  **None**         [1]   No medications prior to admission.   [2]   Current Facility-Administered Medications Ordered in Epic   Medication Dose Route Frequency Provider Last Rate Last Admin   • acetaminophen (Ofirmev) 10 mg/mL infusion premix 1,000 mg  1,000 mg Intravenous Once Manny Pereira MD         Current Outpatient Medications Ordered in Epic   Medication Sig Dispense Refill   • dapagliflozin (FARXIGA) 10 MG Oral Tab Take 1 tablet (10 mg total) by mouth daily.     • Tirzepatide (MOUNJARO) 15 MG/0.5ML Subcutaneous Solution Auto-injector Inject 1 Dose into the skin daily.     [3]   Allergies  Allergen Reactions   • Cat Hair Extract SWELLING     Hay fever   • Reglan [Metoclopramide] OTHER (SEE COMMENTS)     Causes pain in legs   • Adhesive Tape RASH     blisters   • Latex RASH

## 2025-02-27 NOTE — ANESTHESIA PREPROCEDURE EVALUATION
Anesthesia PreOp Note    HPI:     April S Reina is a 65 year old female who presents for preoperative consultation requested by: Manny Pereira MD    Date of Surgery: 2/27/2025    Procedure(s):  Proctoscopy, open total proctocolectomy with end ileostomy  Indication: Ulcerative colitis    Relevant Problems   No relevant active problems       NPO:                         History Review:  Patient Active Problem List    Diagnosis Date Noted   • Ostomy nurse consultation 02/25/2025   • History of adenomatous polyp of colon 01/30/2020   • Vitamin D deficiency 11/20/2017   • History of colon cancer 09/14/2016   • Ulcerative pancolitis with complication (HCC) 09/14/2016   • Abscess, axilla 11/15/2011   • Allergic rhinitis, cause unspecified 04/28/2011   • Asthma (HCC) 04/28/2011   • Cervical polyp 09/15/2010   • Obese 09/15/2010       Past Medical History:   • Adenomatous polyp of colon   • ALLERGIC RHINITIS   • Anxiety state   • Calculus of kidney   • Colitis   • Colon cancer (HCC)   • Diabetes (HCC)   • Disorder of liver    fatty liver   • High blood pressure    patient denies, states her BB is low   • IBS (irritable bowel syndrome)   • Neuropathy   • OBESITY   • OTHER DISEASES    ulcerative colitis in remission   • Personal history of antineoplastic chemotherapy   • PONV (postoperative nausea and vomiting)   • SEASONAL ALLERGIES   • Visual impairment    glasses       Past Surgical History:   Procedure Laterality Date   • Appendectomy     • Cholecystectomy     • Colectomy  2012    80% removed   • Colonoscopy  4/18, 2/17, 7/15, 4/15,  4/14, 12/13, 5/13, 12, 03    chromoendoscopy (7/15, 2/17, 4/18)   • Colonoscopy N/A 02/03/2017    Procedure: COLONOSCOPY;  Surgeon: Obie Rosa MD;  Location: [facility] ENDOSCOPY   • Colonoscopy N/A 04/20/2018    Procedure: COLONOSCOPY;  Surgeon: Obie Rosa MD;  Location: [facility] ENDOSCOPY   • Colonoscopy  02/2020   • Colonoscopy N/A 02/20/2020    Procedure: COLONOSCOPY;  Surgeon: Moe  Obei GARCIA MD;  Location: [facility] ENDOSCOPY   • D & c     • Removal of kidney stone      x2   • Tonsillectomy         Prescriptions Prior to Admission[1]  Current Medications and Prescriptions Ordered in Epic[2]    Allergies[3]    Family History   Problem Relation Age of Onset   • Diabetes Father    • Cancer Mother         breast     Social History     Socioeconomic History   • Marital status:    Tobacco Use   • Smoking status: Never   • Smokeless tobacco: Never   Vaping Use   • Vaping status: Never Used   Substance and Sexual Activity   • Alcohol use: Not Currently   • Drug use: No       Available pre-op labs reviewed.  Lab Results   Component Value Date    WBC 10.7 02/25/2025    RBC 5.46 (H) 02/25/2025    HGB 15.2 02/25/2025    HCT 48.3 (H) 02/25/2025    MCV 88.5 02/25/2025    MCH 27.8 02/25/2025    MCHC 31.5 02/25/2025    RDW 13.5 02/25/2025    .0 02/25/2025     Lab Results   Component Value Date     02/25/2025    K 4.4 02/25/2025     02/25/2025    CO2 26.0 02/25/2025    BUN 25 (H) 02/25/2025    CREATSERUM 1.34 (H) 02/25/2025    GLU 88 02/25/2025    CA 9.2 02/25/2025          Vital Signs:  Body mass index is 41.63 kg/m².   height is 1.6 m (5' 3\") and weight is 106.6 kg (235 lb).   Vitals:    02/21/25 0819   Weight: 106.6 kg (235 lb)   Height: 1.6 m (5' 3\")        Anesthesia Evaluation      History of anesthetic complications   Airway   Mallampati: II  TM distance: >3 FB  Neck ROM: full  Dental - Dentition appears grossly intact     Pulmonary - normal exam   (+) asthma  Cardiovascular - normal exam  (+) hypertension    ROS comment: Abnormal EKG reviewed by cardiology yesterday   Echo post op no symptoms sob cp     Neuro/Psych    (+)  anxiety/panic attacks,        GI/Hepatic/Renal    (+) liver disease    Endo/Other    (+) diabetes mellitus  Abdominal  - normal exam               Anesthesia Plan:   ASA:  3  Plan:   General  Monitors and Lines:   Additonal IV  Airway:  ETT  Post-op Pain  Management: IV analgesics  Plan Comments: Cardiology note reviewed   Informed Consent Plan and Risks Discussed With:  Patient  Use of Blood Products Discussed With:  Patient      I have informed April S Prettyman and/or legal guardian or family member of the nature of the anesthetic plan, benefits, risks including possible dental damage if relevant, major complications, and any alternative forms of anesthetic management.   All of the patient's questions were answered to the best of my ability. The patient desires the anesthetic management as planned.  REJI SONI MD  2/26/2025 8:00 PM  Present on Admission:  **None**       [1]   No medications prior to admission.   [2]   Current Facility-Administered Medications Ordered in Epic   Medication Dose Route Frequency Provider Last Rate Last Admin   • acetaminophen (Ofirmev) 10 mg/mL infusion premix 1,000 mg  1,000 mg Intravenous Once Manny Pereira MD         Current Outpatient Medications Ordered in Epic   Medication Sig Dispense Refill   • dapagliflozin (FARXIGA) 10 MG Oral Tab Take 1 tablet (10 mg total) by mouth daily.     • Tirzepatide (MOUNJARO) 15 MG/0.5ML Subcutaneous Solution Auto-injector Inject 1 Dose into the skin daily.     [3]   Allergies  Allergen Reactions   • Cat Hair Extract SWELLING     Hay fever   • Reglan [Metoclopramide] OTHER (SEE COMMENTS)     Causes pain in legs   • Adhesive Tape RASH     blisters   • Latex RASH

## 2025-02-27 NOTE — OR PREOP
[facility] Patient Status:  Inpatient    2/10/1960 MRN [id number]   Location [facility] PRE OP RECOVERY Attending Manny Pereira MD   Hosp Day # 0 PCP TEE DWYER     Patient is unable to remove jewelry from L pinky finger  Anesthesiologist aware and have deemed it safe to proceed.   Patient is aware of risks of proceeding with surgery with jewelry in place.  Jewelry taped.

## 2025-02-27 NOTE — CONSULTS
[facility]    PATIENT'S NAME: MANUEL, APRIL S   ATTENDING PHYSICIAN: Manny Pereira MD   CONSULTING PHYSICIAN: Justine Figueroa MD   PATIENT ACCOUNT#:   [id number]    LOCATION:  [facility] PACU 1 [facility] 10  MEDICAL RECORD #:   [id number]       YOB: 1960  ADMISSION DATE:       02/27/2025      CONSULT DATE:  02/27/2025    REPORT OF CONSULTATION      REASON FOR CONSULTATION:  Post subtotal colectomy.    HISTORY OF PRESENT ILLNESS:  The patient is a 65-year-old  female with history of ulcerative colitis and prior history of colon cancer with hemicolectomy.  Recently had a colonoscopy which showed rectal polyps with high-grade dysplasia and other polyps, tubular adenomas.  Transurethral resection was not technically possible.  She was evaluated by Colorectal Surgery, Dr. Pereira, and scheduled today for above-mentioned procedure.  Post procedure, transferred to PACU for further monitoring.    PAST MEDICAL HISTORY:  Ulcerative colitis, kidney stones, diabetes mellitus type 2, hypertension, hyperlipidemia, morbid obesity.    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    MEDICATIONS:  Please see medication reconciliation list.     ALLERGIES:  No known drug allergies.    SOCIAL HISTORY:  No tobacco, alcohol, or drug use.     FAMILY HISTORY:  Mother had breast cancer.  Father had diabetes mellitus type 2.    REVIEW OF SYSTEMS:  Currently resting in bed.  Abdominal discomfort.  No chest pain.  No shortness of breath.  Other 12-point review of systems is negative.       PHYSICAL EXAMINATION:    GENERAL:  Alert and oriented to time, place, and person.  Moderate distress.  VITAL SIGNS:  Temperature 97.2, pulse 78, respiratory rate 18, blood pressure 102/59, pulse ox 100% on room air.    HEENT:  Atraumatic.  Oropharynx clear.  Dry mucous membranes.   NECK:  Supple.  No lymphadenopathy.  Trachea midline.  Full range of  motion.  LUNGS:  Clear to auscultation bilaterally.  Diminished breathing sounds both lung bases.  HEART:  Regular rate, rhythm.  S1 and S2 auscultated.  No murmur.   ABDOMEN:  Soft.  Surgical dressing, IRIS drain, and ileostomy note.  Hypoactive bowel sounds.    EXTREMITIES:  No peripheral edema, clubbing, or cyanosis.  NEUROLOGIC:  Motor and sensory intact.    ASSESSMENT AND PLAN:    1.   High-grade dysplastic polyps with underlying ulcerative colitis, status post open total proctocolectomy with end ileostomy.  Pain control.  Monitor surgical wound and drain.  DVT prophylaxis.  Monitor hemodynamic status.  Clear liquid diet.  IV fluids.  2.   Morbid obesity.  Monitor respiratory and hemodynamic status.  3.   Diabetes mellitus type 2.  Monitor Accu-Cheks, sliding scale insulin.    Dictated By Justine Figueroa MD  d: 02/27/2025 16:53:39  t: 02/27/2025 16:59:54  Job [id number]  FB/

## 2025-02-27 NOTE — ANESTHESIA PROCEDURE NOTES
Peripheral Block    Date/Time: 2/27/2025 4:10 PM    Performed by: Deidre Maldonado CRNA  Authorized by: Robert Gardner MD      General Information and Staff    Start Time:  2/27/2025 4:10 PM  End Time:  2/27/2025 4:16 PM  Anesthesiologist:  Monroe Chase MD  CRNA:  Deidre Maldonado CRNA  Performed by:  Anesthesiologist  Patient Location:  OR    Block Placement: Post Induction  Site Identification: real time ultrasound guided, surface landmarks and image stored and retrievable      Reason for Block: at surgeon's request and post-op pain management      Procedure Details    Patient Position:  Supine  Prep: Betadine    Monitoring:  Heart rate, cardiac monitor, continuous pulse ox and blood pressure cuff  Block Type:  TAP  Laterality:  Bilateral  Injection Technique:  Single-shot    Needle    Needle Type:  Echogenic  Needle Gauge:  21 G  Needle Length:  100 mm  Needle Localization:  Ultrasound guidance  Reason for Ultrasound Use: appropriate spread of the medication was noted in real time            Assessment    Injection Assessment:  Good spread noted, incremental injection, low pressure, negative aspiration for heme and negative resistance  Heart Rate Change: No        Medications  2/27/2025 4:10 PM      Additional Comments    Exparel 266 mg mixed with 60 mls 0.25% bupivicaine and 80 ml of normal saline. Out of total volume of 160 ml used 30 ml on each side.

## 2025-02-27 NOTE — ANESTHESIA POSTPROCEDURE EVALUATION
Patient: April S Prettyman    Procedure Summary       Date: 02/27/25 Room / Location: [facility] MAIN OR 03 / [facility] MAIN OR    Anesthesia Start: 0743 Anesthesia Stop: 1651    Procedure: open total proctocolectomy with end ileostomy (Abdomen) Diagnosis: (Ulcerative colitis)    Surgeons: Manny Pereira MD Anesthesiologist: Monroe Chase MD    Anesthesia Type: general ASA Status: 3            Anesthesia Type: general    Vitals Value Taken Time   /60 02/27/25 1650   Temp 97.2 °F (36.2 °C) 02/27/25 1650   Pulse 83 02/27/25 1650   Resp 24 02/27/25 1650   SpO2 94 % 02/27/25 1650   Vitals shown include unfiled device data.    [facility] AN Post Evaluation:   Patient Evaluated in PACU  Patient Participation: complete - patient participated  Level of Consciousness: awake and alert  Pain Score: 1  Pain Management: adequate  Airway Patency:patent  Dental exam unchanged from preop  Yes    Nausea/Vomiting: none  Cardiovascular Status: acceptable, blood pressure returned to baseline and hemodynamically stable  Respiratory Status: acceptable and nasal cannula  Postoperative Hydration euvolemic      Deidre Maldonado CRNA  2/27/2025 4:51 PM

## 2025-02-27 NOTE — H&P
[facility] H&P - Colon and Rectal Surgery  Manny Pereira MD    CHIEF COMPLAINT: Patient presents with: ulcerative colitis, rectal polyp      HISTORY OF PRESENT ILLNESS:  April Reina is a 65 year old female who presents for surgery for ulcerative colitis with several adenomas.    INTERVAL HISTORY  She last saw Dr. Rosa on 9/17/2024...  Assessment/Plan:  54-year-old female with ulcerative colitis since 1978 and history of colon cancer.  Colonoscopy last year showed her disease to be in remission but sigmoid polyps were removed, 1 of which is high-grade dysplasia and a polypoid lesion in the distal rectum abutting the dentate line was biopsied showing tubulovillous adenoma. Attempts at transanal surgical resection of this lesion yielded hyperplastic tissue only.  Proctocolectomy was recommended but deferred until patient lost weight. She reports that on Mounjaro and with dietary change she has gone from 330 pounds to 243 pounds.  I have recommended that the patient return to colorectal surgery and proceed with proctocolectomy.  The pros and cons of end ileostomy versus J-pouch were discussed.  Patient may need GI luminal staining (such as methylene blue) to better visualize the rectal polyp for confirmation of eradication prior to proctocolectomy.    She is here today having lost weight, planning management for distal rectal polyp in the setting of ulcerative colitis.  Her symptoms have remained stable.  Weight reduction has been promoted with the use of Mounjaro, having lost nearly 90 pounds.  She states she is interested in the safest surgical option and is considering total proctocolectomy and end ileostomy rather than pelvic pouch reconstruction.    INITIAL HISTORY (8/15/2023)  She has been seeing Dr. Rosa, last office visit prior to recent colonoscopy was 01/26/2023 and IBD history summarized as follows...  IBD history:  Diagnosed 1978. treated with steroids and azulfadine but no Rx for many  years. pancolitis noted at 2003 colonoscopy per Dr Gross.   2012: transverse colon cancer, T4, N0, M0 signet ring with microsatellite instability. Received adjuvant chemo.  2014: anastomosis at 60cm. Adenomatous polyp at 10cm. Chromoendoscopy 7/15 showed suspicious crypt pattern in rectum but bx hyperplastic.  2/17 chromoendoscopy: hyperplastic polyps  4/18 chromoendoscopy: active colitis, no polyps/lesions  8/18 stopped SSZ due to renal function during episode of kidney stones. Lost insurance so no follow up.  2/20 colonoscopy: Fuchs 2 pancolitis, CD Diff positive. Resume SSZ  2/21 patient stopped all medications.  1/23 resumed SSZ  Since her last visit her symptoms have been under reasonably good control.  She reports no diarrhea, occ bleeding, no more abdominal pain.  Skin lesions/rashes: no  Joint pain/swelling: no  Tobacco/cannabis no  She is being treated with SSZ and folic acid. She has been compliant with the treatment plan. Side effects from medications are not present.    She has been seeing Dr. Rosa for more than 10 years for management of ulcerative colitis.  She reports the colitis has been in remission.  She denies abdominal pain or bloody stools.  An adenoma was identified in the distal rectum at her most recent endoscopic examination, and this is not amenable to endoscopic removal.    Bowel habits:   Frequency: 2-3 times daily  Consistency: Variable, mushy to soft and formed  Urgency: Denied  Continence: Intact    She was recently started on medication for diabetes and has lost 70 pounds (325 - 255 currently).    HISTORY:  Past Medical History:   Diagnosis Date   Adenomatous polyp of colon 5/13   ALLERGIC RHINITIS   Calculus of kidney   Colitis   Colon cancer (HCC) 1/12   Diabetes (HCC)   High blood pressure   High cholesterol   Neuropathy   OBESITY   OTHER DISEASES   ulcerative colitis in remission   SEASONAL ALLERGIES   Visual impairment     Past Surgical History:   Procedure Laterality Date    APPENDECTOMY   CHOLECYSTECTOMY   COLECTOMY 2012   80% removed   COLONOSCOPY 4/18, 2/17, 7/15, 4/15, 4/14, 12/13, 5/13, 12, 03   chromoendoscopy (7/15, 2/17, 4/18)   COLONOSCOPY N/A 02/03/2017   Procedure: COLONOSCOPY; Surgeon: Obie Rosa MD; Location: [facility] ENDOSCOPY   COLONOSCOPY N/A 04/20/2018   Procedure: COLONOSCOPY; Surgeon: Obie Rosa MD; Location: [facility] ENDOSCOPY   COLONOSCOPY N/A 02/20/2020   Procedure: COLONOSCOPY; Surgeon: Obie Rosa MD; Location: [facility] ENDOSCOPY   COLONOSCOPY 07/2023   D & C   TONSILLECTOMY     Family History   Problem Relation Age of Onset   Diabetes Father   Cancer Mother   breast     Social History  Tobacco Use  Smoking status: Never  Smokeless tobacco: Never  Alcohol use: Yes  Comment: rarely, once a year  Drug use: No      CURRENT MEDICATIONS:   Current Outpatient Medications   Medication Sig Dispense Refill   Cholecalciferol (VITAMIN D3) 3000 UNITS Oral Tab Take by mouth daily. (Patient not taking: Reported on 1/3/2024)         ALLERGIES:  Latex and Reglan [Metoclopramide]    REVIEW OF SYSTEMS:  Constitutional: normal  Eyes: normal  Ears/Nose/Throat: Sinus problems  Respiratory: normal  Cardiac/Vascular: normal  GI: See HPI  : Kidney stones, hematuria  Musculoskeletal: Joint pain, arthritis  Skin: normal  Neurologic: normal  Psychiatric: normal  Endocrine: Temperature intolerance  Hematology/Lymphatics: normal  Allergy/Immunology: normal    PHYSICAL EXAM:   Ht 5' 3\" (1.6 m)  Wt 244 lb (110.7 kg)  LMP 06/30/2010  BMI 43.22 kg/m²   Body mass index is 43.22 kg/m².    CONSTITUTIONAL: awake, alert, cooperative, no apparent distress, and appears stated age  EYES: Lids and lashes normal, sclera clear, conjunctiva normal  ENT: Normocephalic, without obvious abnormality, atraumatic  NECK: Supple, symmetrical, trachea midline, no adenopathy, thyroid symmetric, not enlarged and no tenderness  HEMATOLOGIC/LYMPHATICS: No cervical lymphadenopathy, no supraclavicular  lymphadenopathy, no inguinal lymphadenopathy  LUNGS: No increased work of breathing, good air exchange, clear to auscultation bilaterally, no crackles or wheezing  CARDIOVASCULAR: Normal apical impulse, regular rate and rhythm, and no murmur noted, no pedal edema  ABDOMEN:   Obese, midline scar poorly visible,   normal bowel sounds, soft, non-distended, non-tender,   no masses palpated, no hepatosplenomegaly    RECTAL:  External skin is normal, without signs of perianal Crohn's disease  Anal tags are minimal  Prolapse is not noted with straining  Resting tone is elevated, 4/5 (normal 3/5), mildly tender  Squeeze tone is normal, 3/5  Mass is not palpable    MUSCULOSKELETAL: Full range of motion noted. Motor strength is 5 out of 5 all extremities bilaterally.   SKIN: no rashes and no jaundice  6 cm lipoma right lateral costal margin  PSYCHIATRIC:   Orientation: normal  Appearance: normal  Behavior: normal  Attitude toward examiner: normal  Affect: normal  Judgment: Normal    DATA:   COLONOSCOPY 7/21/2023 - Dr. SIDDHARTHA Rosa  Pre-op diagnosis: History of chronic pan ulcerative colitis since 1978 and history of colon cancer.  (Last colonoscopy 3 years ago)    Post-op diagnosis: Minimal evidence of colitis, colon polyps    Findings:  The ileal mucosa was normal. . There was an ileocolonic anastomosis at 70 cm. There was minimal evidence of colitis characterized by erythema and loss of vascular pattern with a few pinpoint erosions. Biopsies taken in the proximal and distal colon. At 27 cm there was a lobulated sessile 1 cm polyp which was piecemeal snare extracted and a small adjacent polyp that was cold snare excised. At 23 cm there was a 1 cm polyp was hot snare excised and an adjacent tiny polyp which was cold snare excised. Both these sites at 27 and 23 cm were tattooed. In the distalmost portion of the rectum abutting the dentate line there was some polypoid appearing tissue which was not easily snared and was therefore  biopsied for review. There was no evidence of vascular anomalies, diverticulosis or mass lesions. Retroflexed view of the anus revealed internal hemorrhoids. Digital rectal exam was normal.    Final Diagnosis   A. Proximal colon, random biopsies:  -Patchy chronic active colitis with mild activity.  -No granulomas or dysplasia identified.    B. Distal colon, random biopsies:  -Patchy chronic active colitis with mild activity.  -No granulomas or dysplasia identified.    C. Colon polyps at 27 cm, polypectomy:  -Fragments of tubular adenomas.    D. Colon polyps at 23 cm, polypectomy:  -Fragments of tubular adenomas with extensive high-grade dysplasia. (See comment)     E. Distal rectal polyp, polypectomy:  -Superficial fragments of tubulovillous adenoma. (See comment)       Op Note 01/05/2024 - SIDDHARTHA Pereira MD  Surgical Findings: tight anal canal limiting exam to medium angled Fall, subtle flat polyp right posterior quadrant, biopsied and then destroyed with cautery,1 x 3cm area, <25% circumference      DESCRIPTION OF PROCEDURE    INDICATION   The patient is a 63 year old female with a rectal polyp near the anorectal junction. She has MUC but is currently not an optimal patient for TPC and pelvic pouch reconstruction due to obesity and expected difficulty with reach of the pouch to the dentate line. Options were discussed with her and she was advised to undergo transanal excision of the rectal polyp. The procedure, indications, risks, benefits, and alternatives were discussed with the patient prior to the procedure. All questions were answered, and the patient wished to proceed.     TECHNIQUE  The patient was taken to the operating room and general anesthesia was induced. The patient was then repositioned in jackknife position with appropriate attention to all joint and pressure points. Timeout was called to reconfirm the patient's identity, diagnosis, planned procedure, and completeness of preoperative preparations. The  perianum was taped apart and prepared with Betadine and draped in the usual sterile fashion.     The procedure began with perianal block followed by digital rectal examination and examination with angled Fall retractor. The anal canal was tight, limiting exam to medium angled Fall. A subtle flat polyp was noted in the right posterior quadrant, biopsied and then destroyed with cautery A 1 x 3cm area, <25% circumference was fulgurated.     Final Diagnosis:     Rectum, right posterior polyp:   Superficial strips of colonic/rectal mucosa with inflammatory cells in lamina propria, focal hyperplastic change, associated mucus and hemorrhage.  No definitive evidence adenomatous change or carcinoma identified     ASSESSMENT AND PLAN:  Rectal polyp, adenoma  At dentate line, unable to remove endoscopically  Attempted transanal excision, limited by anal stenosis and adenoma not identified on biopsy    History of colon cancer  T4 N0 transverse colon cancer removed by Dr. Mukherjee 2012    Ulcerative colitis  Diagnosed 1978 (46-year history)  She is treated with sulfasalazine and is in remission    Obesity  She has lost 90 pounds in the last year    We discussed options for managing existing rectal adenoma and future cancer risk  She is currently focused on the safest surgical option, with respect to management of neoplasia risk as well as surgical complications  She wishes to pursue total proctocolectomy with intersphincteric proctectomy and end ileostomy    Plan  -Preoperative medical clearance (completed)  -Laparotomy, total proctocolectomy with intersphincteric proctectomy and end ileostomy    Procedure, indications, risks, benefits, and alternatives discussed with patient and daughter. All questions answered. They understand and she wishes to proceed.      Prior A/P  Rectal polyp, adenoma  At dentate line, unable to remove endoscopically    Colonic polyps, adenomas  Removed endoscopically    History colon cancer  T4N0  transverse colon cancer removed by Dr. Mukherjee 2012    Ulcerative colitis  Diagnosed 1978 (45 year history)  She is treated with sulfasalazine and he is in remission    Obesity  BMI 47     We discussed ulcerative colitis, colon cancer in the setting of ulcerative colitis, and optimal surgical management in the form of  Completion total proctocolectomy, mucosectomy, handsewn J-pouch ileoanal anastomosis, temporary loop ileostomy  vs  Completion total proctocolectomy with end ileostomy    Total proctocolectomy will be complicated by body habitus (BMI 47) which may render the ability to create a pelvic pouch able to reach the dentate line technically impossible for generate substantial ileostomy related complications (stoma recession, stoma ischemia). Although the stoma is anticipated to be temporary, she may elect to have a permanent ileostomy based on bowel function concerns or she may need a permanent ileostomy if a pouch is not able to be mobilized to reach the dentate line.    A third alternative is   -transanal excision of existing distal rectal polyp  -continued weight loss over the next year (she has lost 70 pounds recently with diabetic medication)   -close endoscopic surveillance until she is ready for total proctocolectomy with or without reconstruction.    Above with discussed with Dr. Rosa who concurs and is comfortable with short-term, close endoscopic surveillance after transanal excision and until weight loss is sufficient such that total proctocolectomy is considered less complicated.    Procedure, indications, risks, benefits, and alternatives discussed with patient. All questions answered. She understands and she wishes to proceed.        The patient was educated regarding the condition, treatment options, and instructed in the above treatment plan.    Manny Pereira MD, MD, FACS, FASCRS

## 2025-02-27 NOTE — ANESTHESIA PROCEDURE NOTES
Airway  Date/Time: 2/27/2025 7:53 AM  Urgency: elective    Airway not difficult    General Information and Staff    Patient location during procedure: OR  Anesthesiologist: Robert Gardner MD  Resident/CRNA: Deidre Maldonado CRNA  Performed: CRNA   Performed by: Deidre Maldonado CRNA  Authorized by: Robert Gardner MD      Indications and Patient Condition  Indications for airway management: airway protection and anesthesia  Spontaneous Ventilation: absent  Sedation level: deep  Preoxygenated: yes  Patient position: sniffing  Mask difficulty assessment: 0 - not attempted    Final Airway Details  Final airway type: endotracheal airway      Successful airway: ETT  Cuffed: yes   Successful intubation technique: Video laryngoscopy  Endotracheal tube insertion site: oral  Blade: GlideScope  Blade size: #3  ETT size (mm): 7.0    Cormack-Lehane Classification: grade I - full view of glottis  Placement verified by: capnometry   Measured from: lips  ETT to lips (cm): 21  Number of attempts at approach: 1  Number of other approaches attempted: 0

## 2025-02-27 NOTE — OPERATIVE REPORT
Operative Note    Patient Name: April [facility]    Date of Surgery: 02/27/25     Preoperative Diagnosis: Ulcerative colitis    Postoperative Diagnosis: same    Primary Surgeon: Manny Pereira MD    Assistant: PRISCA HERNANDEZ    Procedures: Laparotomy, completion total proctocolectomy, intersphincteric proctectomy - extended effort    Surgical Findings: no metastatic disease, extensive intraabdominal adipose and difficult exposure for pelvic dissection    Anesthesia: General    Complications: none    Implants: none    Specimen: colon, rectum and anus    Drains: 15 Fr Tesafye drain in presacral space    Condition: good    Estimated Blood Loss: 100  mL    DESCRIPTION OF PROCEDURE    INDICATION   The patient is a 65 year old female with distal rectal adenoma, longstanding ulcerative colitis, prior right hemicolectomy for cancer, and morbid obesity (BMI 42 after recent 100 pound weight loss). A prior attempt at transanal excision of the rectal adenoma was not successful at localizing, accessing and removing the polyp.     The procedure, indications, risks, benefits, and alternatives were discussed with the patient prior to the procedure. All questions were answered, and the patient wished to proceed.    TECHNIQUE  The patient was taken to the operating room and placed *** on the operating table. Sequential compression boots were placed and functioning throughout the procedure. IV Antibiotics and subcutaneous heparin were administered. General anesthesia was induced with the placement of endotracheal tube. A urinary catheter was inserted. ***e was then repositioned in modified lithotomy using Faraz Kal stirrups with appropriate attention to all joints and pressure points. The abdomen was prepared with ***Chloraprep and draped in the usual sterile fashion. Timeout was called to reconfirm the patient's identity, diagnosis, planned procedure, and completeness of preoperative preparations..    The procedure began with midline  laparotomy. The abdomen was safely entered without injury to underlying viscera. Adhesions under the *** incision were taken down with scissors without injury to bowel.*** The Evgeny wound retractor was placed. Survey of the abdomen did not demonstrate any abnormalities except a palpable mass in the proximal right colon***.    The right colon was previously resected. The ileocolic anastomosis was identified.. ***mobilized from medial to*** lateral to medial***. Transverse colon was placed up into the upper abdomen. Small bowel was eviscerated over the right upper quadrant. Incision was made beginning over the 3rd portion of the duodenum and extended along the edge of the small bowel mesentery to the ileocolic junction. The retroperitoneal plane was entered and dissected bluntly. The dissection then continued laterally to the abdominal side wall over the right kidney and under the hepatic flexure. The ileocolic pedicle was identified and elevated.  The duodenum, ureter and gonadal vessels were identified and pushed posteriorly free from injury. A window was made on either side of it and its origin at the SMA isolated. It was then divided between clamps and tied with 0-chromic. At this point the lesser sac was dissected exposing the right branch of the middle colic vessels and these were divided similarly.     Having completed the medial phase of dissection, we then divided the omental and lateral peritoneal attachments at the white line of Toldt of the right colon. Once all attachments of the right colon were divided***    The sigmoid colon was mobilized from medial to*** lateral to medial*** by incising along the white line of Toldt.  We dissected the left mesocolon off of the retroperitoneum and were able to identify the left ureter and gonadal vessels which were pushed posteriorly free from injury. The splenic flexure was taken down without injury to the spleen or tail of the pancreas. The IMV was divided under  the pancreas.     Attention was then turned towards the pelvic phase of the operation. The plane between fascia propria of the rectum and Waldeyer's fascia was developed sharply under direct vision at the sacral promontory. Dissection was carried down to the pelvic floor.    Next, an inersphincteric proctectomy was performed. An incision was made at the interphincteric groove. ***    The specimen was then retrieved and opened on a back table. The specimen showed ***.    The abdomen was irrigated and suctioned. We then closed the incision using running #1 PDS suture in the fascia. Subcutaneous tissues were irrigated with saline and then hemostasis secured with cautery. Skin was closed using skin staples. Dry sterile dressing was applied.    A TAP block was then provided bilaterally using 7.5cc of 0.5 % ***maria eugenia 1 cm apart in the midaxillary line midway between costal margin and ASIS.       The patient was then allowed to emerge from anesthesia without incident. The patient was extubated in the operating room and taken to the recovery room in satisfactory condition.    I was present throughout the entire operation.    (MsJoseph SilvaByrne' skilled assistance was necessary for patient positioning, prepping, instrument passing and holding, retraction, and suturing.)        _________________________________   Attending Surgeon: Manny Pereira MD   Dictated By: Manny Pereira MD

## 2025-02-28 PROBLEM — Z01.818 PREOP TESTING: Status: ACTIVE | Noted: 2025-02-25

## 2025-02-28 LAB
ANION GAP SERPL CALC-SCNC: 5 MMOL/L (ref 0–18)
BASOPHILS # BLD AUTO: 0.06 X10(3) UL (ref 0–0.2)
BASOPHILS NFR BLD AUTO: 0.3 %
BUN BLD-MCNC: 19 MG/DL (ref 9–23)
BUN/CREAT SERPL: 13.1 (ref 10–20)
CALCIUM BLD-MCNC: 7.6 MG/DL (ref 8.7–10.4)
CHLORIDE SERPL-SCNC: 112 MMOL/L (ref 98–112)
CO2 SERPL-SCNC: 26 MMOL/L (ref 21–32)
CREAT BLD-MCNC: 1.45 MG/DL
DEPRECATED RDW RBC AUTO: 45.8 FL (ref 35.1–46.3)
EGFRCR SERPLBLD CKD-EPI 2021: 40 ML/MIN/1.73M2 (ref 60–?)
EOSINOPHIL # BLD AUTO: 0 X10(3) UL (ref 0–0.7)
EOSINOPHIL NFR BLD AUTO: 0 %
ERYTHROCYTE [DISTWIDTH] IN BLOOD BY AUTOMATED COUNT: 13.6 % (ref 11–15)
GLUCOSE BLD-MCNC: 127 MG/DL (ref 70–99)
GLUCOSE BLDC GLUCOMTR-MCNC: 131 MG/DL (ref 70–99)
HCT VFR BLD AUTO: 40.3 %
HGB BLD-MCNC: 13 G/DL
IMM GRANULOCYTES # BLD AUTO: 0.1 X10(3) UL (ref 0–1)
IMM GRANULOCYTES NFR BLD: 0.5 %
LYMPHOCYTES # BLD AUTO: 1.73 X10(3) UL (ref 1–4)
LYMPHOCYTES NFR BLD AUTO: 9.4 %
MAGNESIUM SERPL-MCNC: 1.3 MG/DL (ref 1.6–2.6)
MCH RBC QN AUTO: 29.4 PG (ref 26–34)
MCHC RBC AUTO-ENTMCNC: 32.3 G/DL (ref 31–37)
MCV RBC AUTO: 91.2 FL
MONOCYTES # BLD AUTO: 1.25 X10(3) UL (ref 0.1–1)
MONOCYTES NFR BLD AUTO: 6.8 %
NEUTROPHILS # BLD AUTO: 15.24 X10 (3) UL (ref 1.5–7.7)
NEUTROPHILS # BLD AUTO: 15.24 X10(3) UL (ref 1.5–7.7)
NEUTROPHILS NFR BLD AUTO: 83 %
OSMOLALITY SERPL CALC.SUM OF ELEC: 300 MOSM/KG (ref 275–295)
PHOSPHATE SERPL-MCNC: 4.7 MG/DL (ref 2.4–5.1)
PLATELET # BLD AUTO: 287 10(3)UL (ref 150–450)
POTASSIUM SERPL-SCNC: 4.9 MMOL/L (ref 3.5–5.1)
RBC # BLD AUTO: 4.42 X10(6)UL
SODIUM SERPL-SCNC: 143 MMOL/L (ref 136–145)
WBC # BLD AUTO: 18.4 X10(3) UL (ref 4–11)

## 2025-02-28 PROCEDURE — 99233 SBSQ HOSP IP/OBS HIGH 50: CPT | Performed by: INTERNAL MEDICINE

## 2025-02-28 RX ORDER — MAGNESIUM OXIDE 400 MG/1
800 TABLET ORAL ONCE
Status: COMPLETED | OUTPATIENT
Start: 2025-02-28 | End: 2025-02-28

## 2025-02-28 NOTE — PLAN OF CARE
Problem: Patient Centered Care  Goal: Patient preferences are identified and integrated in the patient's plan of care  Description: Interventions:  - What would you like us to know as we care for you?   - Provide timely, complete, and accurate information to patient/family  - Incorporate patient and family knowledge, values, beliefs, and cultural backgrounds into the planning and delivery of care  - Encourage patient/family to participate in care and decision-making at the level they choose  - Honor patient and family perspectives and choices  Outcome: Progressing     Problem: Patient/Family Goals  Goal: Patient/Family Long Term Goal  Description: Patient's Long Term Goal:     Interventions:  -   - See additional Care Plan goals for specific interventions  Outcome: Progressing  Goal: Patient/Family Short Term Goal  Description: Patient's Short Term Goal:     Interventions:   -  - See additional Care Plan goals for specific interventions  Outcome: Progressing     Pt resting in bed. Pulled up in bed throughout night. Reminded pt throughout shift that moving and getting out of bed is important. Montoya in place. IRIS to bulb suction-serosanguinous output. Dressing to abdomen with small amount of drainage. Abdominal binder in place. Ostomy bag c/d/I- small amount of brown liquid output overnight, small amount of soft stool near stoma noted in AM. Accuchecks ACHS. Poor appetite. Zofran and compazine prn for nausea. Dilaudid prn for pain. IV antibiotics given. IVF continued. Safety measures in place. Frequent rounding being done. Family at bedside overnight.

## 2025-02-28 NOTE — PHYSICAL THERAPY NOTE
PHYSICAL THERAPY EVALUATION - INPATIENT     Room Number: 446/446-A  Evaluation Date: 2/28/2025  Type of Evaluation: Initial   Physician Order: PT Eval and Treat    Presenting Problem: Rectal poyp, open total proteotomy  Co-Morbidities : asthma, rectal polyp, obvesity, rectal mass, bladder CA, hypoxia  Reason for Therapy: Mobility Dysfunction and Discharge Planning    PHYSICAL THERAPY ASSESSMENT   Patient is a 65 year old female admitted 2/27/2025 for open total proctocolectomy with end ileostomy .  Prior to admission, patient's baseline is indep with all mobility and ADL's lives with spouse.  Patient is currently functioning below baseline with bed mobility, transfers, gait, maintaining seated position, standing prolonged periods, and performing household tasks.  Patient is requiring moderate assist as a result of the following impairments: decreased functional strength, decreased endurance/aerobic capacity, pain, impaired standing balance, decreased muscular endurance, and medical status.  Physical Therapy will continue to follow for duration of hospitalization.    Patient will benefit from continued skilled PT Services at discharge to promote prior level of function and safety with additional support and return home with home health PT.    PLAN DURING HOSPITALIZATION  Nursing Mobility Recommendation : 1 Assist  PT Device Recommendation: Rolling walker  PT Treatment Plan: Bed mobility;Body mechanics;Endurance;Energy conservation;Patient education;Gait training;Strengthening;Transfer training;Balance training;Stoop training;Stair training;Family education  Rehab Potential : Good  Frequency (Obs): Daily     PHYSICAL THERAPY MEDICAL/SOCIAL HISTORY   History related to current admission: open total proctocolectomy with end ileostomy      Problem List  Active Problems:    Rectal polyp      HOME SITUATION  Type of Home: House  Home Layout: Two level;Ramped entrance           Stairs to Bedroom: 7    Railing: Yes    Lives  With: Spouse    Drives: Yes   Patient Regularly Uses: None     Prior Level of [place]: Indep with all mobility and ADL's lives with spouse. Drives.    SUBJECTIVE  It feels good getting up and walking the stomach is sore of course but it's nice to be moving thanks for your help.     PHYSICAL THERAPY EXAMINATION   OBJECTIVE  Precautions: Bed/chair alarm;Abdominal protective strategies;Drain(s)  Fall Risk: Standard fall risk    WEIGHT BEARING RESTRICTION       PAIN ASSESSMENT  Ratin  Location: abd sx site  Management Techniques: Activity promotion;Body mechanics;Breathing techniques;Relaxation;Repositioning (abd coreset)    COGNITION  Overall Cognitive Status:  WFL - within functional limits    RANGE OF MOTION AND STRENGTH ASSESSMENT  Upper extremity ROM and strength are within functional limits   Lower extremity ROM is within functional limits   Lower extremity strength is within functional limits 5/5    BALANCE  Static Sitting: Fair +  Dynamic Sitting: Fair  Static Standing: Fair -  Dynamic Standing: Fair -    ACTIVITY TOLERANCE  Pulse: 103  Heart Rate Source: Monitor  Resp: 18  BP: 100/61  BP Location: Right arm  BP Method: Automatic  Patient Position: Sitting    O2 WALK  Oxygen Therapy  SPO2% Ambulation on Oxygen: 97    AM-PAC '6-Clicks' INPATIENT SHORT FORM - BASIC MOBILITY  How much difficulty does the patient currently have...  Patient Difficulty: Turning over in bed (including adjusting bedclothes, sheets and blankets)?: A Little   Patient Difficulty: Sitting down on and standing up from a chair with arms (e.g., wheelchair, bedside commode, etc.): A Little   Patient Difficulty: Moving from lying on back to sitting on the side of the bed?: A Little   How much help from another person does the patient currently need...   Help from Another: Moving to and from a bed to a chair (including a wheelchair)?: A Little   Help from Another: Need to walk in hospital room?: A Little   Help from Another: Climbing 3-5  steps with a railing?: A Lot     AM-PAC Score:  Raw Score: 17   Approx Degree of Impairment: 50.57%   Standardized Score (AM-PAC Scale): 42.13   CMS Modifier (G-Code): CK    FUNCTIONAL ABILITY STATUS  Functional Mobility/Gait Assessment  Gait Assistance: Minimum assistance  Distance (ft): 30  Assistive Device: Rolling walker  Pattern: Shuffle (decreased step length and ofelia slight flexed posture abd soreness)  Rolling: moderate assist  Supine to Sit: minimal assist  Sit to Supine: minimal assist  Sit to Stand: minimal assist    Exercise/Education Provided:  Bed mobility  Body mechanics  Energy conservation  Functional activity tolerated  Gait training  Posture  ROM  Strengthening  Lower therapeutic exercise:  Ankle pumps  Heel raises  LAQ  Transfer training    Skilled Therapy Provided: Pt ed with bed mobility and transfers with mod A for log roll and supine to sit transfer for abdominal sparring with abd corset secured. Pt ed with CGA with transfers and gait progression with min A with RW amb 30' with decreased step length. Pt ed with therex in chair x 10 reps for LE strength and ROM. Pt is on track for a return home with [facility] PT and spouse assist as medical progress allows. PT handed off to OT at end of PT eval.     The patient's Approx Degree of Impairment: 50.57% has been calculated based on documentation in the [facility] '6 clicks' Inpatient Basic Mobility Short Form.  Research supports that patients with this level of impairment may benefit from [facility] PT with family assist.    Final disposition will be made by interdisciplinary medical team.    Patient End of Session: Up in chair;With  staff;Needs met;Call light within reach;RN aware of session/findings;All patient questions and concerns addressed;Alarm set;Family present    CURRENT GOALS  Goals to be met by: 3/15/2025  Patient Goal Patient's self-stated goal is: Return home    Goal #1 Patient is able to demonstrate supine - sit EOB @ level: independent     Goal  #1   Current Status    Goal #2 Patient is able to demonstrate transfers Sit to/from Stand at assistance level: modified independent with walker - rolling     Goal #2  Current Status    Goal #3 Patient is able to ambulate 300 feet with assist device: walker - rolling at assistance level: modified independent   Goal #3   Current Status    Goal #4 Patient will negotiate 7 stairs/one curb w/ assistive device and supervision   Goal #4   Current Status    Goal #5 Patient to demonstrate independence with home activity/exercise instructions provided to patient in preparation for discharge.   Goal #5   Current Status    Goal #6    Goal #6  Current Status      Patient Evaluation Complexity Level:  History Low - no personal factors and/or co-morbidities   Examination of body systems Low -  addressing 1-2 elements   Clinical Presentation Low- Stable   Clinical Decision Making  Low Complexity     Gait Training: 15 minutes

## 2025-02-28 NOTE — PROGRESS NOTES
[facility]  part of [facility]    Progress Note     Reina Patient Status:  Inpatient    2/10/1960 MRN [id number]   Location [facility] 4W/SW/SE Attending Ross Erickson MD   Hosp Day # 1 PCP TEE DWYER        Subjective:    Riena is a(n) 65 year old female     POD#1 s/p laparotomy, completion total proctocolectomy, intersphincteric proctectomy, end ileostomy    Pain moderate, sit <-> stand is most uncomfortable, controlled with IV Dilaudid  No oral intake yet, some nausea  Sitting up in chair, has ambulated            Allergies/Medications:   Allergies: Allergies[1]   [COMPLETED] magnesium oxide (Mag-Ox) tab 800 mg  800 mg Oral Once    [COMPLETED] sodium chloride 0.9 % IV bolus 1,000 mL  1,000 mL Intravenous Once    [COMPLETED] famotidine (Pepcid) tab 20 mg  20 mg Oral Once    Or    [COMPLETED] famotidine (Pepcid) 20 mg/2mL injection 20 mg  20 mg Intravenous Once    [COMPLETED] heparin (Porcine) 5000 UNIT/ML injection 5,000 Units  5,000 Units Subcutaneous Once    [COMPLETED] ceFAZolin (Ancef) 2g in 10mL IV syringe premix  2 g Intravenous Once    [COMPLETED] metroNIDAZOLE in sodium chloride 0.79% (Flagyl) 5 mg/mL IVPB premix 500 mg  500 mg Intravenous Once    bupivacaine liposome (Exparel) 1.3% injection 20 mL  20 mL Infiltration Once (Intra-Op)    lactated ringers infusion  2 mL/kg/hr Intravenous Continuous    heparin (Porcine) 5000 UNIT/ML injection 5,000 Units  5,000 Units Subcutaneous Q8H MATT    polyethylene glycol (PEG 3350) (Miralax) 17 g oral packet 17 g  17 g Oral Daily PRN    sennosides (Senokot) tab 17.2 mg  17.2 mg Oral Nightly PRN    famotidine (Pepcid) tab 20 mg  20 mg Oral Daily    Or    famotidine (Pepcid) 20 mg/2mL injection 20 mg  20 mg Intravenous Daily    magnesium oxide (Mag-Ox) tab 400 mg  400 mg Oral Daily    oxyCODONE immediate release tab 2.5 mg  2.5 mg Oral Q4H PRN    Or    oxyCODONE immediate release tab 5 mg  5 mg Oral Q4H PRN     HYDROmorphone (Dilaudid) 1 MG/ML injection 0.2 mg  0.2 mg Intravenous Q2H PRN    Or    HYDROmorphone (Dilaudid) 1 MG/ML injection 0.4 mg  0.4 mg Intravenous Q2H PRN    ondansetron (Zofran) 4 MG/2ML injection 4 mg  4 mg Intravenous Q6H PRN    [COMPLETED] ceFAZolin (Ancef) 2g in 10mL IV syringe premix  2 g Intravenous Q8H    [COMPLETED] ondansetron (Zofran) 4 MG/2ML injection 4 mg  4 mg Intravenous Once    [COMPLETED] metroNIDAZOLE in sodium chloride 0.79% (Flagyl) 5 mg/mL IVPB premix 500 mg  500 mg Intravenous Q8H    prochlorperazine (Compazine) 10 MG/2ML injection 10 mg  10 mg Intravenous Q6H PRN    [COMPLETED] acetaminophen (Ofirmev) 10 mg/mL infusion premix 1,000 mg  1,000 mg Intravenous Once           Objective:   Vital Signs:  Blood pressure 107/62, pulse 103, temperature 98.1 °F (36.7 °C), temperature source Oral, resp. rate 18, height 5' 3\" (1.6 m), weight 238 lb (108 kg), last menstrual period 06/30/2010, SpO2 92%.     I/O last 3 completed shifts:  In: 4134 [I.V.:3814; IV PIGGYBACK:320]  Out: 1075 [Urine:925; Drains:125; Stool:25]    General: No acute distress. Alert and oriented x 3.  HEENT: Moist mucous membranes. EOM-I. PERRL  Neck: No lymphadenopathy.  No JVD. No carotid bruits.  Respiratory: Clear to auscultation bilaterally.  No wheezes. No rhonchi.  Cardiovascular: S1, S2.  Regular rate and rhythm.  No murmurs. Equal pulses   Abdomen: Soft, nontender, nondistended.  Positive bowel sounds. No rebound tenderness  Stoma: viable, not functional  Incision(s): dressing clean, dry  Drain: serosang    Neurologic: No focal neurological deficits.  Musculoskeletal: Full range of motion of all extremities.  No swelling noted.  Integument: No lesions. No erythema.  Psychiatric: Appropriate mood and affect.        Assessment and Plan:     Rectal polyp    Ulcerative colitis    POD#1 s/p laparotomy, completion total proctocolectomy, intersphincteric proctectomy, end ileostomy    Expected progress, continue  ERAS  Awaiting bowel function and diet tolerance  Pain moderate, controlled with IV dilaudid                  Results:     Lab Results   Component Value Date    WBC 18.4 (H) 02/28/2025    HGB 13.0 02/28/2025    HCT 40.3 02/28/2025    .0 02/28/2025    CREATSERUM 1.45 (H) 02/28/2025    BUN 19 02/28/2025     02/28/2025    K 4.9 02/28/2025     02/28/2025    CO2 26.0 02/28/2025     (H) 02/28/2025    CA 7.6 (L) 02/28/2025    ALB 3.1 (L) 02/17/2020    ALKPHO 97 02/17/2020    BILT 0.50 02/17/2020    TP 7.6 02/17/2020    AST 17 02/17/2020    ALT 21 02/17/2020    T4F 1.34 12/18/2015    TSH 2.730 12/18/2015    MG 1.3 (L) 02/28/2025    PHOS 4.7 02/28/2025       XR ABDOMEN (1 VIEW) (CPT=74018)    Result Date: 2/27/2025  CONCLUSION:   No retained metallic foreign bodies are seen.    elm-remote  Dictated by (CST): Jose Funes MD on 2/27/2025 at 4:12 PM     Finalized by (CST): Jose Funes MD on 2/27/2025 at 4:13 PM                      Manny Pereira MD  2/28/2025         [1]   Allergies  Allergen Reactions    Cat Hair Extract SWELLING     Hay fever    Reglan [Metoclopramide] OTHER (SEE COMMENTS)     Causes pain in legs    Adhesive Tape RASH     blisters    Latex RASH

## 2025-02-28 NOTE — PROGRESS NOTES
WOUND CARE NOTE    History:  Past Medical History:    Adenomatous polyp of colon    ALLERGIC RHINITIS    Anxiety state    Calculus of kidney    Colitis    Colon cancer (HCC)    Diabetes (HCC)    Disorder of liver    fatty liver    High blood pressure    patient denies, states her BB is low    IBS (irritable bowel syndrome)    Neuropathy    OBESITY    OTHER DISEASES    ulcerative colitis in remission    Personal history of antineoplastic chemotherapy    PONV (postoperative nausea and vomiting)    SEASONAL ALLERGIES    Visual impairment    glasses     Past Surgical History:   Procedure Laterality Date    Appendectomy      Cholecystectomy      Colectomy  2012    80% removed    Colonoscopy  4/18, 2/17, 7/15, 4/15,  4/14, 12/13, 5/13, 12, 03    chromoendoscopy (7/15, 2/17, 4/18)    Colonoscopy N/A 02/03/2017    Procedure: COLONOSCOPY;  Surgeon: Obie Rosa MD;  Location: [facility] ENDOSCOPY    Colonoscopy N/A 04/20/2018    Procedure: COLONOSCOPY;  Surgeon: Obie Rosa MD;  Location: [facility] ENDOSCOPY    Colonoscopy  02/2020    Colonoscopy N/A 02/20/2020    Procedure: COLONOSCOPY;  Surgeon: Obie Rosa MD;  Location: [facility] ENDOSCOPY    D & c      Removal of kidney stone      x2    Tonsillectomy        Social History     Socioeconomic History    Marital status:    Tobacco Use    Smoking status: Never    Smokeless tobacco: Never   Vaping Use    Vaping status: Never Used   Substance and Sexual Activity    Alcohol use: Not Currently    Drug use: No     Social Drivers of Health     Food Insecurity: No Food Insecurity (2/27/2025)    NCSS - Food Insecurity     Worried About Running Out of Food in the Last Year: No     Ran Out of Food in the Last Year: No   Transportation Needs: No Transportation Needs (2/27/2025)    NCSS - Transportation     Lack of Transportation: No   Housing Stability: Not At Risk (2/27/2025)    NCSS - Housing/Utilities     Has Housing: Yes     Worried About Losing Housing: No     Unable to Get  Utilities: No         PLAN   Recommendations:  Dr. Pereira is following the pt.    Nurses to reinforce the ileostomy care teaching, the pt. should be emptying her pouch and assisting with any care.  Dietary consult for recommendations for nutrition to optimize wound healing & new ileostomy   To prevent sliding: decrease head of bed and elevate foot of bed as medical condition tolerates  Glucose control to help promote wound healing    Wound(s)  Location: Abdomen  Per Dr. Pereira      Ostomy:  Change flange every 5-7 days and prn  Appliance 1 3/4 inch 2 pc, skin prep  Teaching: Ostomy folder given  Patient: started ileostomy care teaching.  Response to teaching: good    Discharge Recommendations: The pt is learning and participating with the care      OBJECTIVE   MD Consult new new ileostomy care and teaching      ASSESSMENT   Keyur Score:  Keyur Scale Score: 20    Chart Reviewed: yes    The pt. is sitting up in bed, a little anxious with lots of questions, sat and listened to the pt. and answered all her questions. Started education on the ileostomy care, pouch emptying, appliance was changed. The pt. assisted with some of the care, I encouraged the pt. to assist with emptying her pouch and to get up and sit in the chair and ambulate in the chowdhury. 1 3/4 inch appliance change, gave the pt. a ileostomy information folder and reviewed it with the pt.  Left IRIS drain site is leaking bloody exudate, cleansed the pt, changed the dressing and new linens given, used Biopatch, drain sponge and Tegaderm. All questions answered. I will follow up this afternoon as the pt. wants to practice and change it herself. Met the pt.'s spouse Mathew.  Spoke with the nurse.       Ostomy:  Stoma location: RUQ  Stoma type: end ileostomy   Stoma color: red, moist  Stoma condition: edematous  Stoma diameter: 1 1/8 inches  Ostomy output: brown liquid stool & mushy stool  Stoma height: budding  Peristomal skin: intact, pink  Pouching system:two  piece skin prep, 1 3/4 inch  Able to care for stoma: Yes she is learning     Allergies: Cat hair extract, Reglan [metoclopramide], Adhesive tape, and Latex    Labs:   Lab Results   Component Value Date    WBC 18.4 (H) 02/28/2025    HGB 13.0 02/28/2025    HCT 40.3 02/28/2025    .0 02/28/2025    CREATSERUM 1.45 (H) 02/28/2025    BUN 19 02/28/2025     02/28/2025    K 4.9 02/28/2025     02/28/2025    CO2 26.0 02/28/2025     (H) 02/28/2025    CA 7.6 (L) 02/28/2025    ALB 3.1 (L) 02/17/2020    ALKPHO 97 02/17/2020    BILT 0.50 02/17/2020    TP 7.6 02/17/2020    AST 17 02/17/2020    ALT 21 02/17/2020    MG 1.3 (L) 02/28/2025    PHOS 4.7 02/28/2025     No results found for: \"PREALBUMIN\"      Time Spent 1 Hour 45 Minutes.    Danna Cordero RN  [facility]  [facility]  [phone]     02/28/25 1000   Wound 02/27/25 Abdomen Lower;Medial   Date First Assessed/Time First Assessed: 02/27/25 0850   Primary Wound Type: Incision  Location: Abdomen  Wound Location Orientation: Lower;Medial   Dressing Status Dry;Intact;Old drainage   Ileostomy Standard (jaelyn Wilkins) RUQ   Placement Date/Time: 02/27/25 1540   Inserted by: Dr. Pereira  Ileostomy Type: Standard (Claudette, end)  Location: RUQ  Stoma Size (cm): (c) 1.8 cm  Appliance Size: 1 3/4   Stomal Appliance 2 piece;Intact  (changed for teaching)   Stomal Assessment Moist;Edema;Red;Pink  (1 1/8 inch stoma)   Peristomal Assessment Intact;Pink   Treatment Appliance change;Bag change;Site care  (appliance changed)   Dressing Change Due 03/05/25   Output (mL) 20 mL  (liquid and mushy brown stool)   Wound Follow Up   Follow up needed Yes

## 2025-02-28 NOTE — PROGRESS NOTES
Progress Note      Patient Status:  Inpatient    2/10/1960 MRN [id number]   Location [facility] 4W/SW/SE Attending Ross Erickson MD   Hosp Day # 1 PCP TEE DWYER     Chief Complaint: No chief complaint on file.        Subjective:   S: Patient seen and examined, chart reviewed.   S/P open total proctocolectomy with end ileostomy.  Doing well.   but having some bowel gas movement and tolerating liquid diet.     Review of Systems:   10 point ROS completed and was negative, except for pertinent positive and negatives stated in subjective.                Objective:   Vital signs:  Temp:  [97.2 °F (36.2 °C)-98.1 °F (36.7 °C)] 98.1 °F (36.7 °C)  Pulse:  [] 103  Resp:  [13-20] 18  BP: ()/(44-78) 100/61  SpO2:  [87 %-100 %] 92 %    Wt Readings from Last 6 Encounters:   25 238 lb (108 kg)   24 276 lb (125.2 kg)   23 270 lb (122.5 kg)   20 300 lb (136.1 kg)   20 290 lb (131.5 kg)   18 300 lb (136.1 kg)       Physical Exam:    General: No acute distress.   Respiratory: Clear to auscultation bilaterally. No wheezes. No rhonchi.  Cardiovascular: S1, S2. Regular rate and rhythm. No murmurs, rubs or gallops.   Abdomen: Soft, nontender, nondistended.  IRIS drain in place, bandages c/d/I, staples ok, ileostomy bag in place, good stoma. Bowel sounds reduced.   Neurologic: No focal neurological deficits.   Musculoskeletal: Moves all extremities.  Extremities: No edema.    Results:   Diagnostic Data:      Labs:    Labs Last 24 Hours:   BMP     CBC    Other     Na 143 Cl 112 BUN 19 Glu 127   Hb 13.0   PTT - Procal -   K 4.9 CO2 26.0 Cr 1.45   WBC 18.4 >< .0  INR - CRP -   Renal Lytes Endo    Hct 40.3   Trop - D dim -   eGFR - Ca 7.6 POC Gluc  131    LFT   pBNP - Lactic -   eGFR AA - PO4 4.7 A1c 5.5   AST - APk - Prot -  LDL -      Recent Labs   Lab 25  1212 25  0552   RBC 5.46* 4.42   HGB 15.2 13.0   HCT 48.3* 40.3   MCV 88.5  91.2   MCH 27.8 29.4   MCHC 31.5 32.3   RDW 13.5 13.6   NEPRELIM 7.07 15.24*   WBC 10.7 18.4*   .0 287.0       No results found for: \"PT\", \"INR\"    Recent Labs   Lab 02/25/25  1212 02/28/25  0552   GLU 88 127*   BUN 25* 19   CREATSERUM 1.34* 1.45*   CA 9.2 7.6*    143   K 4.4 4.9    112   CO2 26.0 26.0       No results for input(s): \"DELROY\", \"LIP\" in the last 168 hours.    Recent Labs   Lab 02/28/25  0552   MG 1.3*   PHOS 4.7       No results for input(s): \"URINE\", \"CULTI\", \"BLDSMR\" in the last 168 hours.      XR ABDOMEN (1 VIEW) (CPT=74018)    Result Date: 2/27/2025  CONCLUSION:   No retained metallic foreign bodies are seen.    elm-remote  Dictated by (CST): Jose Funes MD on 2/27/2025 at 4:12 PM     Finalized by (CST): Jose Funes MD on 2/27/2025 at 4:13 PM               Pro-Calcitonin  No results for input(s): \"PCT\" in the last 168 hours.    Cardiac  No results for input(s): \"TROP\", \"PBNP\" in the last 168 hours.    Imaging: Imaging data reviewed in Epic.    XR ABDOMEN (1 VIEW) (CPT=74018)    Result Date: 2/27/2025  CONCLUSION:   No retained metallic foreign bodies are seen.    elm-remote  Dictated by (CST): Jose Funes MD on 2/27/2025 at 4:12 PM     Finalized by (CST): Jose Funes MD on 2/27/2025 at 4:13 PM              Medications:    bupivacaine liposome  20 mL Infiltration Once (Intra-Op)    heparin  5,000 Units Subcutaneous Q8H MATT    famotidine  20 mg Oral Daily    Or    famotidine  20 mg Intravenous Daily    magnesium oxide  400 mg Oral Daily    insulin aspart  1-7 Units Subcutaneous TID CC       Assessment & Plan:   ASSESSMENT / PLAN:          High-grade dysplastic polyps with underlying ulcerative colitis, status post open total proctocolectomy with end ileostomy.  Pain control.  Monitor surgical wound and drain.  DVT prophylaxis.  Monitor hemodynamic status.  Clear liquid diet.  IV fluids. ADAT  2.       Morbid obesity.  Monitor respiratory and hemodynamic status.  3.        Diabetes mellitus type 2.  Monitor Accu-Cheks, sliding scale insulin.      dvt prophylaxis: sc heparin  code status: full code  dispo: home upon medical clearance  >55min spent, >50% spent counseling and coordinating care in the form of educating pt/family and d/w consultants and staff. Most of the time spent discussing the above plan.     Estimated date of discharge: 1-2 days  Discharge is dependent on: surgical clearance, tolerating diet and ambulating  At this point Ms. Huang is expected to be discharge to: home    Plan of care discussed with patient, sister and nurse.     Ross Erickson MD, FAAP, FACP  [facility] Hospitalist  I respond to Epic Chat and [facility] Connect

## 2025-02-28 NOTE — PAYOR COMM NOTE
--------------  ADMISSION REVIEW     Payor: [facility] CORE/NAVIGATE  Subscriber #:  [id number]  Authorization Number: [id number]    Admit date: 2/27/25  Admit time:  6:13 AM     ------ OP REPORT IS NOT DICTATED AT THIS TIME------    HOSPITALIST:      REASON FOR CONSULTATION:  Post subtotal colectomy.     HISTORY OF PRESENT ILLNESS:  The patient is a 65-year-old  female with history of ulcerative colitis and prior history of colon cancer with hemicolectomy.  Recently had a colonoscopy which showed rectal polyps with high-grade dysplasia and other polyps, tubular adenomas.  Transurethral resection was not technically possible.  She was evaluated by Colorectal Surgery, Dr. Pereira, and scheduled today for above-mentioned procedure.  Post procedure, transferred to PACU for further monitoring.     PAST MEDICAL HISTORY:  Ulcerative colitis, kidney stones, diabetes mellitus type 2, hypertension, hyperlipidemia, morbid obesity.     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     MEDICATIONS:  Please see medication reconciliation list.      ALLERGIES:  No known drug allergies.     SOCIAL HISTORY:  No tobacco, alcohol, or drug use.      FAMILY HISTORY:  Mother had breast cancer.  Father had diabetes mellitus type 2.     REVIEW OF SYSTEMS:  Currently resting in bed.  Abdominal discomfort.  No chest pain.  No shortness of breath.  Other 12-point review of systems is negative.        PHYSICAL EXAMINATION:    GENERAL:  Alert and oriented to time, place, and person.  Moderate distress.  VITAL SIGNS:  Temperature 97.2, pulse 78, respiratory rate 18, blood pressure 102/59, pulse ox 100% on room air.    HEENT:  Atraumatic.  Oropharynx clear.  Dry mucous membranes.   NECK:  Supple.  No lymphadenopathy.  Trachea midline.  Full range of motion.  LUNGS:  Clear to auscultation bilaterally.  Diminished breathing sounds both lung bases.  HEART:  Regular rate, rhythm.   S1 and S2 auscultated.  No murmur.   ABDOMEN:  Soft.  Surgical dressing, IRIS drain, and ileostomy note.  Hypoactive bowel sounds.    EXTREMITIES:  No peripheral edema, clubbing, or cyanosis.  NEUROLOGIC:  Motor and sensory intact.     ASSESSMENT AND PLAN:    1.       High-grade dysplastic polyps with underlying ulcerative colitis, status post open total proctocolectomy with end ileostomy.  Pain control.  Monitor surgical wound and drain.  DVT prophylaxis.  Monitor hemodynamic status.  Clear liquid diet.  IV fluids.  2.       Morbid obesity.  Monitor respiratory and hemodynamic status.  3.       Diabetes mellitus type 2.  Monitor Accu-Cheks, sliding scale insulin.      2/28:    NURSING:  Montoya in place. IRIS to bulb suction-serosanguinous output. Dressing to abdomen with small amount of drainage. Abdominal binder in place. Ostomy bag c/d/I- small amount of brown liquid output overnight, small amount of soft stool near stoma noted in AM. Accuchecks ACHS. Poor appetite. Zofran and compazine prn for nausea. Dilaudid prn for pain. IV antibiotics given. IVF continued.     NURSING:    Ostomy Care  Following up as the pt. wanted to change her appliance this afternoon. She is up in the chair, her sister Kathy is at the bedside. The pt. does not feel like changing her appliance, she notes she will change it Monday. I reviewed the pouch emptying, supplies and appliance changes with the pt. and her sister. Dr. Erickson was into see the pt.  All questions answered. The pt. practiced securing the pouch. I asked her to start emptying the pouch with staff. Spoke with the nurse. I gave the pt. home instructions and some supplies for home and reviewed these with the pt. We also reviewed the information folder and supply ordering.    NO PHYSICIAN NOTES YET TODAY      MEDICATIONS ADMINISTERED IN LAST 1 DAY:  ceFAZolin (Ancef) 2g in 10mL IV syringe premix       Date Action Dose Route User    2/28/2025 0940 New Bag 2 g Intravenous Neha Beckham  RN    2/28/2025 0012 New Bag 2 g Intravenous Jody Diamond RN          famotidine (Pepcid) tab 20 mg       Date Action Dose Route User    2/28/2025 0935 Given 20 mg Oral Neha Beckham RN       heparin (Porcine) 5000 UNIT/ML injection 5,000 Units       Date Action Dose Route User    2/28/2025 0935 Given 5,000 Units Subcutaneous (Right Upper Arm) Neha Beckham RN          HYDROmorphone (Dilaudid) 1 MG/ML injection 0.4 mg       Date Action Dose Route User    2/27/2025 1730 Given 0.4 mg Intravenous Marcelina Flanagan RN          HYDROmorphone (Dilaudid) 1 MG/ML injection 0.2 mg       Date Action Dose Route User    2/28/2025 0412 Given 0.2 mg Intravenous Jody Diamond RN          HYDROmorphone (Dilaudid) 1 MG/ML injection 0.4 mg       Date Action Dose Route User    2/28/2025 0946 Given 0.4 mg Intravenous Neha Beckham RN    2/27/2025 2026 Given 0.4 mg Intravenous Jody Diamond RN          lactated ringers infusion       Date Action Dose Route User    2/28/2025 1442 New Bag 2 mL/kg/hr × 108 kg Intravenous Neha Beckham RN    2/28/2025 0940 New Bag 2 mL/kg/hr × 108 kg Intravenous Neha Beckham RN    2/28/2025 0016 New Bag 2 mL/kg/hr × 108 kg Intravenous Jody Diamond RN    2/27/2025 1850 New Bag 2 mL/kg/hr × 108 kg Intravenous Neha Beckham RN          magnesium oxide (Mag-Ox) tab 800 mg       Date Action Dose Route User    2/28/2025 0934 Given 800 mg Oral Neha Beckham RN          metroNIDAZOLE in sodium chloride 0.79% (Flagyl) 5 mg/mL IVPB premix 500 mg       Date Action Dose Route User    2/27/2025 1710 New Bag 500 mg Intravenous Marcelina Flanagan RN          metroNIDAZOLE in sodium chloride 0.79% (Flagyl) 5 mg/mL IVPB premix 500 mg       Date Action Dose Route User    2/28/2025 0930 New Bag 500 mg Intravenous Neha Beckham RN    2/28/2025 0012 New Bag 500 mg Intravenous Jody Diamond, RN          ondansetron (Zofran) 4 MG/2ML injection       Date Action Dose Route User    2/27/2025 1616 Given 8 mg  Intravenous Deidre Maldonado CRNA          ondansetron (Zofran) 4 MG/2ML injection 4 mg       Date Action Dose Route User    2/27/2025 1849 Given 4 mg Intravenous Neha Beckham RN          ondansetron (Zofran) 4 MG/2ML injection 4 mg       Date Action Dose Route User    2/27/2025 1742 Given 4 mg Intravenous Marcelina Flanagan RN          prochlorperazine (Compazine) 10 MG/2ML injection 10 mg       Date Action Dose Route User    2/27/2025 2026 Given 10 mg Intravenous Jody Diamond RN          sodium bicarbonate injection       Date Action Dose Route User    2/27/2025 1500 Given 25 mL Intravenous Deidre Maldonado CRNA          sodium chloride 0.9% infusion       Date Action Dose Route User    2/27/2025 1646 New Bag 80 mL (none) (Abdomen) Manny Pereira MD          sodium chloride 0.9 % IV bolus 1,000 mL       Date Action Dose Route User    2/28/2025 1117 New Bag 1,000 mL Intravenous Edmundo Carty RN       Vitals (last day)       Date/Time Temp Pulse Resp BP SpO2 Weight O2 Device O2 Flow Rate (L/min) [facility]    02/28/25 1330 -- 103 18 100/61 -- -- -- -- DF    02/28/25 1219 -- 102 -- 100/61 92 % -- Nasal cannula 2 L/min DD    02/28/25 1058 -- -- -- 80/44 93 % -- -- --     02/28/25 1055 98.1 °F (36.7 °C) 102 18 86/47 87 % -- Nasal cannula 2 L/min     02/28/25 0643 97.9 °F (36.6 °C) 98 16 111/78 94 % -- Nasal cannula 1.5 L/min KS    02/28/25 0021 -- 91 16 98/49 93 % -- Nasal cannula 1.5 L/min KS    02/27/25 2024 -- 82 16 100/62 96 % -- -- 1.5 L/min KS    02/27/25 1816 -- 79 16 103/56 95 % -- Nasal cannula 1.5 L/min CV    02/27/25 1800 97.8 °F (36.6 °C) 84 19 -- 95 % -- Nasal cannula 3 L/min SP    02/27/25 1750 -- 75 13 109/59 100 % -- Nasal cannula -- SP    02/27/25 1740 -- 72 15 111/60 100 % -- Nasal cannula -- SP    02/27/25 1730 -- 79 20 107/59 100 % -- Nasal cannula 3 L/min SP    02/27/25 1720 -- 79 15 110/64 100 % -- Nasal cannula -- SP    02/27/25 1710 -- 77 16 110/63 100 % -- Nasal cannula 3 L/min SP    02/27/25  1700 -- 79 15 110/68 100 % -- Partial rebreather mask -- SP    02/27/25 1650 97.2 °F (36.2 °C) 78 18 102/59 100 % -- -- -- MT    02/27/25 1650 -- -- -- -- -- -- Partial rebreather mask 3 L/min SP    02/27/25 1640 -- -- -- -- 100 % -- Partial rebreather mask 10 L/min SP    02/27/25 0633 97.7 °F (36.5 °C) 90 16 113/65 92 % 238 lb (108 kg) None (Room air) -- DJ

## 2025-02-28 NOTE — DIETARY NOTE
Brief Nutrition Note:     Educated pt on diet with new ileostomy. Handout and contact information provided. Urged chewing foods well and drinking adequate fluids. Advance to general diet as tolerated.     Jennifer Camejo RD, LDN   Clinical Dietitian [id number]

## 2025-02-28 NOTE — PROGRESS NOTES
02/28/25 1445   Ileostomy Standard ([place]aud[place], end) RUQ   Placement Date/Time: 02/27/25 1540   Inserted by: Dr. Pereira  Ileostomy Type: Standard (Claudette, end)  Location: RUQ  Stoma Size (cm): (c) 1.8 cm  Appliance Size: 1 3/4   Stomal Appliance 2 piece;Intact  (pt. wants to wait to change the appliance Monday)   Stomal Assessment Red;Pink;Moist;Budding   Peristomal Assessment BEATRICE   Dressing Change Due 03/05/25   Wound Follow Up   Follow up needed Yes     Ostomy Care  Following up as the pt. wanted to change her appliance this afternoon. She is up in the chair, her sister Kathy is at the bedside. The pt. does not feel like changing her appliance, she notes she will change it Monday. I reviewed the pouch emptying, supplies and appliance changes with the pt. and her sister. Dr. Erickson was into see the pt.  All questions answered. The pt. practiced securing the pouch. I asked her to start emptying the pouch with staff. Spoke with the nurse. I gave the pt. home instructions and some supplies for home and reviewed these with the pt. We also reviewed the information folder and supply ordering.

## 2025-02-28 NOTE — PLAN OF CARE
Patient alert and oriented x4. Hypotension, pt asymptomatic, MD notified, 1L IVF given. On 2L o2. IS encouraged. Deep breathing encouraged. Tolerating CLD, minimal intake due to low appetite/ pt fear of eating. To trial LF/ soft. Sx dressing intact. IRIS drain with leaking, dressing changed x3 and reinforced. IRIS to bulb suction. PRN dilaudid for pain. Ostomy in place, WOCN visited at bedside. Pt verbalized struggling with coping with the ostomy. Support offered. IVF and antibiotics as ordered. Bed locked and in lowest position. Up with assist and RW to chair. Fall precautions in place.   Problem: Patient Centered Care  Goal: Patient preferences are identified and integrated in the patient's plan of care  Description: Interventions:  - What would you like us to know as we care for you? I am overwhelmed by my ostomy  - Provide timely, complete, and accurate information to patient/family  - Incorporate patient and family knowledge, values, beliefs, and cultural backgrounds into the planning and delivery of care  - Encourage patient/family to participate in care and decision-making at the level they choose  - Honor patient and family perspectives and choices  Outcome: Progressing     Problem: Patient/Family Goals  Goal: Patient/Family Long Term Goal  Description: Patient's Long Term Goal: to go home    Interventions:  - ivf, iv abx, activity as tolerated, pain control  - See additional Care Plan goals for specific interventions  Outcome: Progressing  Goal: Patient/Family Short Term Goal  Description: Patient's Short Term Goal: to have pain controlled    Interventions:   - as needed pain medication, nonpharmacologic pain control  - See additional Care Plan goals for specific interventions  Outcome: Progressing     Problem: PAIN - ADULT  Goal: Verbalizes/displays adequate comfort level or patient's stated pain goal  Description: INTERVENTIONS:  - Encourage pt to monitor pain and request assistance  - Assess pain using  appropriate pain scale  - Administer analgesics based on type and severity of pain and evaluate response  - Implement non-pharmacological measures as appropriate and evaluate response  - Consider cultural and social influences on pain and pain management  - Manage/alleviate anxiety  - Utilize distraction and/or relaxation techniques  - Monitor for opioid side effects  - Notify MD/LIP if interventions unsuccessful or patient reports new pain  - Anticipate increased pain with activity and pre-medicate as appropriate  Outcome: Progressing     Problem: RISK FOR INFECTION - ADULT  Goal: Absence of fever/infection during anticipated neutropenic period  Description: INTERVENTIONS  - Monitor WBC  - Administer growth factors as ordered  - Implement neutropenic guidelines  Outcome: Progressing     Problem: SAFETY ADULT - FALL  Goal: Free from fall injury  Description: INTERVENTIONS:  - Assess pt frequently for physical needs  - Identify cognitive and physical deficits and behaviors that affect risk of falls.  - [place] fall precautions as indicated by assessment.  - Educate pt/family on patient safety including physical limitations  - Instruct pt to call for assistance with activity based on assessment  - Modify environment to reduce risk of injury  - Provide assistive devices as appropriate  - Consider OT/PT consult to assist with strengthening/mobility  - Encourage toileting schedule  Outcome: Progressing     Problem: DISCHARGE PLANNING  Goal: Discharge to home or other facility with appropriate resources  Description: INTERVENTIONS:  - Identify barriers to discharge w/pt and caregiver  - Include patient/family/discharge partner in discharge planning  - Arrange for needed discharge resources and transportation as appropriate  - Identify discharge learning needs (meds, wound care, etc)  - Arrange for interpreters to assist at discharge as needed  - Consider post-discharge preferences of patient/family/discharge partner  -  Complete POLST form as appropriate  - Assess patient's ability to be responsible for managing their own health  - Refer to Case Management Department for coordinating discharge planning if the patient needs post-hospital services based on physician/LIP order or complex needs related to functional status, cognitive ability or social support system  Outcome: Progressing

## 2025-02-28 NOTE — OCCUPATIONAL THERAPY NOTE
OCCUPATIONAL THERAPY EVALUATION - INPATIENT     Room Number: 446/446-A  Evaluation Date: 2/28/2025  Type of Evaluation: Initial   Presenting Problem: high-grade dysplastic polyps with underlying ulcerative colitis; s/p open total proctocolectomy and end ileostomy  Co-Morbilities: Ulcerative colitis, kidney stones, diabetes mellitus type 2, hypertension, hyperlipidemia, morbid obesity    Physician Order: IP Consult to Occupational Therapy  Reason for Therapy: ADL/IADL Dysfunction and Discharge Planning    OCCUPATIONAL THERAPY ASSESSMENT   Patient is a 65 year old female admitted 2/27/2025 for high-grade dysplastic polyps with underlying ulcerative colitis; s/p open total proctocolectomy and end ileostomy.  Prior to admission, patient's baseline is independent with ADLs and mobility.  Patient is currently functioning below baseline with toileting, lower body dressing, bed mobility, transfers, static standing balance, dynamic standing balance, and functional standing tolerance.  Patient is requiring stand-by assist, contact guard assist, and minimal assist as a result of the following impairments: decreased functional strength, decreased endurance, pain, impaired standing balance, decreased muscular endurance, and medical status. Occupational Therapy will continue to follow for duration of hospitalization.    Patient will benefit from continued skilled OT Services with no additional skilled services but increased support at home.    PLAN DURING HOSPITALIZATION  OT Device Recommendations: Shower chair;Reacher  OT Treatment Plan: ADL training;Functional transfer training;Patient/Family education;Patient/Family training;Equipment eval/education;Compensatory technique education;Energy conservation/work simplification techniques     OCCUPATIONAL THERAPY MEDICAL/SOCIAL HISTORY   Problem List  Active Problems:    Preop testing    Rectal polyp    HOME SITUATION  Type of Home: House  Home Layout: Two level; Ramped  entrance  Lives With: Spouse  Toilet and Equipment: Standard height toilet  Shower/Tub and Equipment: Tub-shower combo  Drives: Yes  Patient Regularly Uses: None    Prior Level of [place]: Prior to admission pt was independent with ADLs and mobility.     SUBJECTIVE  \"My legs work fine, its all in my stomach.\"     OCCUPATIONAL THERAPY EXAMINATION      OBJECTIVE  Precautions: Bed/chair alarm; Abdominal protective strategies; Drain(s)  Fall Risk: Standard fall risk      PAIN ASSESSMENT  Ratin  Location: abdomen  Management Techniques: Relaxation; Repositioning; Nurse notified      ACTIVITY TOLERANCE           BP: 107/62 (96/42 after brief mobility)  BP Location: Right arm  BP Method: Automatic  Patient Position: Sitting    O2 SATURATIONS  Oxygen Therapy  SPO2% on Oxygen at Rest: 97  At rest oxygen flow (liters per minute): 2  SPO2% Ambulation on Oxygen: 97  Ambulation oxygen flow (liters per minute): 2    COGNITION  Overall Cognitive Status:  WFL - within functional limits    RANGE OF MOTION   Upper extremity ROM is within functional limits     STRENGTH ASSESSMENT  Upper extremity strength is within functional limits     COORDINATION  Gross Motor: WFL   Fine Motor: WFL     ACTIVITIES OF DAILY LIVING ASSESSMENT  AM-PAC ‘6-Clicks’ Inpatient Daily Activity Short Form  How much help from another person does the patient currently need…  -   Putting on and taking off regular lower body clothing?: A Lot  -   Bathing (including washing, rinsing, drying)?: A Lot  -   Toileting, which includes using toilet, bedpan or urinal? : A Lot  -   Putting on and taking off regular upper body clothing?: A Little  -   Taking care of personal grooming such as brushing teeth?: None  -   Eating meals?: None    AM-PAC Score:  Score: 17  Approx Degree of Impairment: 50.11%  Standardized Score (AM-PAC Scale): 37.26  CMS Modifier (G-Code): CK    FUNCTIONAL TRANSFER ASSESSMENT  Sit to Stand: Edge of Bed  Edge of Bed: Contact Guard  Assist  Simulated toilet transfer: CGA at RW level     FUNCTIONAL MOBILITY  Pt required CGA with RW support to complete door to chair mobility to simulate household distances. Pt with no LOB during task.     BALANCE ASSESSMENT  Static Sitting: Stand-by Assist  Static Standing: Contact Guard Assist  Dynamic sitting: SBA  Dynamic Standing: CGA     FUNCTIONAL ADL ASSESSMENT  Grooming Seated: Supervision  LB Dressing Seated: Minimal Assist  Comments: Pt able to return demo partial figure four with BLE this session, limited in full ROM by pain. Pt requiring min assist to manage LB clothing from recliner level.     Skilled Therapy Provided:   RN cleared pt for participation. Pt received sitting EOB with PT and spouse present at bedside. Pt agreeable to participation in therapy. To assess pt's participation during tasks while also providing intermittent education to maximize participation, OT facilitated the following: functional transfers, functional mobility, and ADL tasks. Pt tolerated treatment fairly well and completed all tasks with assist levels listed above, but overall limited by pain. Pt and spouse ed on abd sparing strategies for pain management and DME for use at discharge (tub bench). Pt demo good safety awareness and good carry-over of ed throughout session. Pt remained in recliner with all needs in reach and questions answered. RN aware.         EDUCATION PROVIDED  Patient Education : Role of Occupational Therapy; Plan of Care; Functional Transfer Techniques; DME Recommendations; Fall Prevention; Posture/Positioning; Proper Body Mechanics; Abdominal Protective Strategies; Other (compensatory ADL techniques)  Patient's Response to Education: Verbalized Understanding; Returned Demonstration  Family/Caregiver Education : Plan of Care; Role of Occupational Therapy  Family/Caregiver's Response to Education: Verbalized Understanding    The patient's Approx Degree of Impairment: 50.11% has been calculated based on  documentation in the [facility] '6 clicks' Inpatient Daily Activity Short Form.  Research supports that patients with this level of impairment may benefit from rehab but anticipate pt could return home with no skilled OT with spouse support.  Final disposition will be made by interdisciplinary medical team.     Patient End of Session: Up in chair;Needs met;Call light within reach;RN aware of session/findings;Hospital anti-slip socks;All patient questions and concerns addressed;Family present    OT Goals  Patients self stated goal is: none stated      Patient will complete functional transfer with mod I   Comment:     Patient will complete toileting with mod I   Comment:     Patient will tolerate standing for 5 minutes in prep for adls with mod I    Comment:    Patient will complete item retrieval with mod I   Comment:          Goals  on:   Frequency:     Patient Evaluation Complexity Level:   Occupational Profile/Medical History MODERATE - Expanded review of history including review of medical or therapy record   Specific performance deficits impacting engagement in ADL/IADL MODERATE  3 - 5 performance deficits   Client Assessment/Performance Deficits MODERATE - Comorbidities and min to mod modifications of tasks    Clinical Decision Making MODERATE - Analysis of occupational profile, detailed assessments, several treatment options    Overall Complexity MODERATE     OT Session Time: 23 minutes  Self-Care Home Management: 23 minutes

## 2025-03-01 LAB
ANION GAP SERPL CALC-SCNC: 5 MMOL/L (ref 0–18)
BASOPHILS # BLD AUTO: 0.03 X10(3) UL (ref 0–0.2)
BASOPHILS NFR BLD AUTO: 0.3 %
BUN BLD-MCNC: 15 MG/DL (ref 9–23)
BUN/CREAT SERPL: 12.3 (ref 10–20)
C DIFF TOX B STL QL: NEGATIVE
CALCIUM BLD-MCNC: 7.7 MG/DL (ref 8.7–10.4)
CHLORIDE SERPL-SCNC: 109 MMOL/L (ref 98–112)
CO2 SERPL-SCNC: 27 MMOL/L (ref 21–32)
CREAT BLD-MCNC: 1.22 MG/DL
DEPRECATED RDW RBC AUTO: 46.9 FL (ref 35.1–46.3)
EGFRCR SERPLBLD CKD-EPI 2021: 49 ML/MIN/1.73M2 (ref 60–?)
EOSINOPHIL # BLD AUTO: 0.06 X10(3) UL (ref 0–0.7)
EOSINOPHIL NFR BLD AUTO: 0.5 %
ERYTHROCYTE [DISTWIDTH] IN BLOOD BY AUTOMATED COUNT: 13.7 % (ref 11–15)
GLUCOSE BLD-MCNC: 113 MG/DL (ref 70–99)
HCT VFR BLD AUTO: 34.1 %
HGB BLD-MCNC: 10.8 G/DL
IMM GRANULOCYTES # BLD AUTO: 0.05 X10(3) UL (ref 0–1)
IMM GRANULOCYTES NFR BLD: 0.4 %
LYMPHOCYTES # BLD AUTO: 1.62 X10(3) UL (ref 1–4)
LYMPHOCYTES NFR BLD AUTO: 13.6 %
MAGNESIUM SERPL-MCNC: 1.4 MG/DL (ref 1.6–2.6)
MCH RBC QN AUTO: 29.6 PG (ref 26–34)
MCHC RBC AUTO-ENTMCNC: 31.7 G/DL (ref 31–37)
MCV RBC AUTO: 93.4 FL
MONOCYTES # BLD AUTO: 0.87 X10(3) UL (ref 0.1–1)
MONOCYTES NFR BLD AUTO: 7.3 %
NEUTROPHILS # BLD AUTO: 9.3 X10 (3) UL (ref 1.5–7.7)
NEUTROPHILS # BLD AUTO: 9.3 X10(3) UL (ref 1.5–7.7)
NEUTROPHILS NFR BLD AUTO: 77.9 %
OSMOLALITY SERPL CALC.SUM OF ELEC: 294 MOSM/KG (ref 275–295)
PLATELET # BLD AUTO: 205 10(3)UL (ref 150–450)
POTASSIUM SERPL-SCNC: 4.2 MMOL/L (ref 3.5–5.1)
RBC # BLD AUTO: 3.65 X10(6)UL
SODIUM SERPL-SCNC: 141 MMOL/L (ref 136–145)
WBC # BLD AUTO: 11.9 X10(3) UL (ref 4–11)

## 2025-03-01 PROCEDURE — 99233 SBSQ HOSP IP/OBS HIGH 50: CPT | Performed by: HOSPITALIST

## 2025-03-01 NOTE — PROGRESS NOTES
Progress Note      Patient Status:  Inpatient    2/10/1960 MRN [id number]   Location [facility] 4W/SW/SE Attending Ross Erickson MD   Hosp Day # 2 PCP TEE DWYER     Chief Complaint: No chief complaint on file.        Subjective:   S: Patient seen and examined, chart reviewed.   S/P open total proctocolectomy with end ileostomy.  Doing well.   but having some bowel gas movement and tolerating liquid diet.      at bedside     Review of Systems:   10 point ROS completed and was negative, except for pertinent positive and negatives stated in subjective.                Objective:   Vital signs:  Temp:  [98.4 °F (36.9 °C)-99.1 °F (37.3 °C)] 98.4 °F (36.9 °C)  Pulse:  [] 89  Resp:  [16-18] 18  BP: ()/(51-62) 91/52  SpO2:  [89 %-93 %] 93 %    Wt Readings from Last 6 Encounters:   25 238 lb (108 kg)   24 276 lb (125.2 kg)   23 270 lb (122.5 kg)   20 300 lb (136.1 kg)   20 290 lb (131.5 kg)   18 300 lb (136.1 kg)       Physical Exam:    General: No acute distress.   Respiratory: Clear to auscultation bilaterally. No wheezes. No rhonchi.  Cardiovascular: S1, S2. Regular rate and rhythm. No murmurs, rubs or gallops.   Abdomen: Soft, nontender, nondistended.  IRIS drain in place, bandages c/d/I, staples ok, ileostomy bag in place, good stoma. Bowel sounds reduced.   Neurologic: No focal neurological deficits.   Musculoskeletal: Moves all extremities.  Extremities: No edema.    Results:   Diagnostic Data:      Labs:    Labs Last 24 Hours:   BMP     CBC    Other     Na 141 Cl 109 BUN 15 Glu 113   Hb 10.8   PTT - Procal -   K 4.2 CO2 27.0 Cr 1.22   WBC 11.9 >< .0  INR - CRP -   Renal Lytes Endo    Hct 34.1   Trop - D dim -   eGFR - Ca 7.7 POC Gluc  -    LFT   pBNP - Lactic -   eGFR AA - PO4 - A1c -   AST - APk - Prot -  LDL -      Recent Labs   Lab 25  1212 25  0552 25  0719   RBC 5.46* 4.42 3.65*   HGB  15.2 13.0 10.8*   HCT 48.3* 40.3 34.1*   MCV 88.5 91.2 93.4   MCH 27.8 29.4 29.6   MCHC 31.5 32.3 31.7   RDW 13.5 13.6 13.7   NEPRELIM 7.07 15.24* 9.30*   WBC 10.7 18.4* 11.9*   .0 287.0 205.0       No results found for: \"PT\", \"INR\"    Recent Labs   Lab 02/25/25  1212 02/28/25  0552 03/01/25  0719   GLU 88 127* 113*   BUN 25* 19 15   CREATSERUM 1.34* 1.45* 1.22*   CA 9.2 7.6* 7.7*    143 141   K 4.4 4.9 4.2    112 109   CO2 26.0 26.0 27.0       No results for input(s): \"DELROY\", \"LIP\" in the last 168 hours.    Recent Labs   Lab 02/28/25  0552 03/01/25  0719   MG 1.3* 1.4*   PHOS 4.7  --        No results for input(s): \"URINE\", \"CULTI\", \"BLDSMR\" in the last 168 hours.      XR ABDOMEN (1 VIEW) (CPT=74018)    Result Date: 2/27/2025  CONCLUSION:   No retained metallic foreign bodies are seen.    elm-remote  Dictated by (CST): Jose Funes MD on 2/27/2025 at 4:12 PM     Finalized by (CST): Jose Funes MD on 2/27/2025 at 4:13 PM               Pro-Calcitonin  No results for input(s): \"PCT\" in the last 168 hours.    Cardiac  No results for input(s): \"TROP\", \"PBNP\" in the last 168 hours.    Imaging: Imaging data reviewed in Epic.    XR ABDOMEN (1 VIEW) (CPT=74018)    Result Date: 2/27/2025  CONCLUSION:   No retained metallic foreign bodies are seen.    elm-remote  Dictated by (CST): Jose Funes MD on 2/27/2025 at 4:12 PM     Finalized by (CST): Joes Funes MD on 2/27/2025 at 4:13 PM              Medications:    bupivacaine liposome  20 mL Infiltration Once (Intra-Op)    heparin  5,000 Units Subcutaneous Q8H MATT    famotidine  20 mg Oral Daily    Or    famotidine  20 mg Intravenous Daily    magnesium oxide  400 mg Oral Daily       Assessment & Plan:   ASSESSMENT / PLAN:     High-grade dysplastic polyps with underlying ulcerative colitis, status post open total proctocolectomy with end ileostomy on 2/27/25 with Dr. 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  Pain control.  Monitor surgical wound and drain.  DVT prophylaxis.  Monitor hemodynamic status.  Clear liquid diet.  IV fluids. ADAT    Morbid obesity.  Monitor respiratory and hemodynamic status.  Diabetes mellitus type 2.  Monitor Accu-Cheks, sliding scale insulin.  Hypomagnesemia-->very low, Mag~1.3-->suppl  H/o UC  H/o nephrolithiasis  H/o HTN, current BP low, not on any BP meds, cont IVF until meaningful oral intake  H/o HL      dvt prophylaxis: sc heparin  code status: full code  dispo: home upon medical clearance       Discharge is dependent on: surgical clearance, tolerating diet and ambulating  At this point Ms. Huang is expected to be discharge to: home    Plan of care discussed with patient,  and nurse.      MDM risk:High. Time spent on chart/note review, review of labs/imaging, discussion with patient, physical exam, discussion with staff, consultants, coordinating care, writing progress note, and discussion of plan of care.

## 2025-03-01 NOTE — PROGRESS NOTES
[facility]  part of [facility]    Progress Note     Reina Patient Status:  Inpatient    2/10/1960 MRN [id number]   Location [facility] 4W/SW/SE Attending Ross Erickson MD   Hosp Day # 2 PCP TEE DWYER        Subjective:    Reina is a(n) 65 year old female     POD#2 s/p laparotomy, completion total proctocolectomy, intersphincteric proctectomy, end ileostomy    Pain moderate, perineal wound most uncomfortable, she had some discharge yesterday but none today  No oral intake yet  Sitting up in chair, has ambulated            Allergies/Medications:   Allergies: Allergies[1]   [COMPLETED] magnesium oxide (Mag-Ox) tab 800 mg  800 mg Oral Once    [COMPLETED] sodium chloride 0.9 % IV bolus 1,000 mL  1,000 mL Intravenous Once    [COMPLETED] famotidine (Pepcid) tab 20 mg  20 mg Oral Once    Or    [COMPLETED] famotidine (Pepcid) 20 mg/2mL injection 20 mg  20 mg Intravenous Once    [COMPLETED] heparin (Porcine) 5000 UNIT/ML injection 5,000 Units  5,000 Units Subcutaneous Once    [COMPLETED] ceFAZolin (Ancef) 2g in 10mL IV syringe premix  2 g Intravenous Once    [COMPLETED] metroNIDAZOLE in sodium chloride 0.79% (Flagyl) 5 mg/mL IVPB premix 500 mg  500 mg Intravenous Once    bupivacaine liposome (Exparel) 1.3% injection 20 mL  20 mL Infiltration Once (Intra-Op)    lactated ringers infusion  2 mL/kg/hr Intravenous Continuous    heparin (Porcine) 5000 UNIT/ML injection 5,000 Units  5,000 Units Subcutaneous Q8H [facility]    polyethylene glycol (PEG 3350) (Miralax) 17 g oral packet 17 g  17 g Oral Daily PRN    sennosides (Senokot) tab 17.2 mg  17.2 mg Oral Nightly PRN    famotidine (Pepcid) tab 20 mg  20 mg Oral Daily    Or    famotidine (Pepcid) 20 mg/2mL injection 20 mg  20 mg Intravenous Daily    magnesium oxide (Mag-Ox) tab 400 mg  400 mg Oral Daily    oxyCODONE immediate release tab 2.5 mg  2.5 mg Oral Q4H PRN    Or    oxyCODONE immediate release tab 5 mg  5 mg Oral Q4H PRN     HYDROmorphone (Dilaudid) 1 MG/ML injection 0.2 mg  0.2 mg Intravenous Q2H PRN    Or    HYDROmorphone (Dilaudid) 1 MG/ML injection 0.4 mg  0.4 mg Intravenous Q2H PRN    ondansetron (Zofran) 4 MG/2ML injection 4 mg  4 mg Intravenous Q6H PRN    [COMPLETED] ceFAZolin (Ancef) 2g in 10mL IV syringe premix  2 g Intravenous Q8H    [COMPLETED] ondansetron (Zofran) 4 MG/2ML injection 4 mg  4 mg Intravenous Once    [COMPLETED] metroNIDAZOLE in sodium chloride 0.79% (Flagyl) 5 mg/mL IVPB premix 500 mg  500 mg Intravenous Q8H    prochlorperazine (Compazine) 10 MG/2ML injection 10 mg  10 mg Intravenous Q6H PRN    [COMPLETED] acetaminophen (Ofirmev) 10 mg/mL infusion premix 1,000 mg  1,000 mg Intravenous Once           Objective:   Vital Signs:  Blood pressure 102/51, pulse 94, temperature 98.6 °F (37 °C), temperature source Oral, resp. rate 16, height 5' 3\" (1.6 m), weight 238 lb (108 kg), last menstrual period 06/30/2010, SpO2 90%.     I/O last 3 completed shifts:  In: 1714 [I.V.:1714]  Out: 1335 [Urine:1100; Drains:150; Stool:85]    General: No acute distress. Alert and oriented x 3.  HEENT: Moist mucous membranes. EOM-I. PERRL  Neck: No lymphadenopathy.  No JVD. No carotid bruits.  Respiratory: Clear to auscultation bilaterally.  No wheezes. No rhonchi.  Cardiovascular: S1, S2.  Regular rate and rhythm.  No murmurs. Equal pulses   Abdomen: Soft, nontender, nondistended.  Positive bowel sounds. No rebound tenderness  Stoma: viable, minimal stool and flatus noted  Incision(s): dressing clean, dry  Drain: serosang    Neurologic: No focal neurological deficits.  Musculoskeletal: Full range of motion of all extremities.  No swelling noted.  Integument: No lesions. No erythema.  Psychiatric: Appropriate mood and affect.        Assessment and Plan:     Rectal polyp    Ulcerative colitis    POD#2 s/p laparotomy, completion total proctocolectomy, intersphincteric proctectomy, end ileostomy    Expected progress, continue  ERAS  Awaiting bowel function and diet tolerance  Pain moderate, controlled with IV dilaudid                  Results:     Lab Results   Component Value Date    WBC 11.9 (H) 03/01/2025    HGB 10.8 (L) 03/01/2025    HCT 34.1 (L) 03/01/2025    .0 03/01/2025    CREATSERUM 1.22 (H) 03/01/2025    BUN 15 03/01/2025     03/01/2025    K 4.2 03/01/2025     03/01/2025    CO2 27.0 03/01/2025     (H) 03/01/2025    CA 7.7 (L) 03/01/2025    ALB 3.1 (L) 02/17/2020    ALKPHO 97 02/17/2020    BILT 0.50 02/17/2020    TP 7.6 02/17/2020    AST 17 02/17/2020    ALT 21 02/17/2020    T4F 1.34 12/18/2015    TSH 2.730 12/18/2015    MG 1.4 (L) 03/01/2025    PHOS 4.7 02/28/2025       XR ABDOMEN (1 VIEW) (CPT=74018)    Result Date: 2/27/2025  CONCLUSION:   No retained metallic foreign bodies are seen.    elm-remote  Dictated by (CST): Jose Funes MD on 2/27/2025 at 4:12 PM     Finalized by (CST): Jose Funes MD on 2/27/2025 at 4:13 PM                      Manny Pereira MD  3/01/2025         [1]   Allergies  Allergen Reactions    Cat Hair Extract SWELLING     Hay fever    Reglan [Metoclopramide] OTHER (SEE COMMENTS)     Causes pain in legs    Adhesive Tape RASH     blisters    Latex RASH

## 2025-03-01 NOTE — PLAN OF CARE
A/O x 4. Midline incision with surgical dressing intact. Abdominal IRIS drain leaking at site, serosanguinous output in bulb and leaking around insertion site. Ileostomy with mushy stool in bag, minimal flatus seen this shift. Pt states she ate a few bites of potato's and soup. Zofran and compazine for nausea. Dilaudid for pain. Up in chair all day, encouraging pt to walk in hallways. IVF infusing. Montoya in place. Pt updated on plan of care. Bed in lowest position, call light in reach, frequent rounding, nonskid footwear, fall precautions in place.

## 2025-03-01 NOTE — PLAN OF CARE
Patient is alert and oriented. 2L NC. Vital signs stable. Montoya in place and draining. Pain managed with prn IVP dilaudid and prn zofran given for nausea. IVF continued. Ambulating with 1 and a walker. Right ileostomy in place with output. IRIS drain to bulb suction. Call light and personal belongings within reach. Safety precautions in place.  at bedside.     Problem: Patient Centered Care  Goal: Patient preferences are identified and integrated in the patient's plan of care  Description: Interventions:  - What would you like us to know as we care for you? I am overwhelmed by my ostomy  - Provide timely, complete, and accurate information to patient/family  - Incorporate patient and family knowledge, values, beliefs, and cultural backgrounds into the planning and delivery of care  - Encourage patient/family to participate in care and decision-making at the level they choose  - Honor patient and family perspectives and choices  Outcome: Progressing     Problem: Patient/Family Goals  Goal: Patient/Family Long Term Goal  Description: Patient's Long Term Goal: to go home    Interventions:  - ivf, iv abx, activity as tolerated, pain control  - See additional Care Plan goals for specific interventions  Outcome: Progressing  Goal: Patient/Family Short Term Goal  Description: Patient's Short Term Goal: to have pain controlled    Interventions:   - as needed pain medication, nonpharmacologic pain control  - See additional Care Plan goals for specific interventions  Outcome: Progressing     Problem: PAIN - ADULT  Goal: Verbalizes/displays adequate comfort level or patient's stated pain goal  Description: INTERVENTIONS:  - Encourage pt to monitor pain and request assistance  - Assess pain using appropriate pain scale  - Administer analgesics based on type and severity of pain and evaluate response  - Implement non-pharmacological measures as appropriate and evaluate response  - Consider cultural and social influences on  pain and pain management  - Manage/alleviate anxiety  - Utilize distraction and/or relaxation techniques  - Monitor for opioid side effects  - Notify MD/LIP if interventions unsuccessful or patient reports new pain  - Anticipate increased pain with activity and pre-medicate as appropriate  Outcome: Progressing     Problem: RISK FOR INFECTION - ADULT  Goal: Absence of fever/infection during anticipated neutropenic period  Description: INTERVENTIONS  - Monitor WBC  - Administer growth factors as ordered  - Implement neutropenic guidelines  Outcome: Progressing     Problem: SAFETY ADULT - FALL  Goal: Free from fall injury  Description: INTERVENTIONS:  - Assess pt frequently for physical needs  - Identify cognitive and physical deficits and behaviors that affect risk of falls.  - [place] fall precautions as indicated by assessment.  - Educate pt/family on patient safety including physical limitations  - Instruct pt to call for assistance with activity based on assessment  - Modify environment to reduce risk of injury  - Provide assistive devices as appropriate  - Consider OT/PT consult to assist with strengthening/mobility  - Encourage toileting schedule  Outcome: Progressing     Problem: DISCHARGE PLANNING  Goal: Discharge to home or other facility with appropriate resources  Description: INTERVENTIONS:  - Identify barriers to discharge w/pt and caregiver  - Include patient/family/discharge partner in discharge planning  - Arrange for needed discharge resources and transportation as appropriate  - Identify discharge learning needs (meds, wound care, etc)  - Arrange for interpreters to assist at discharge as needed  - Consider post-discharge preferences of patient/family/discharge partner  - Complete POLST form as appropriate  - Assess patient's ability to be responsible for managing their own health  - Refer to Case Management Department for coordinating discharge planning if the patient needs post-hospital services  based on physician/LIP order or complex needs related to functional status, cognitive ability or social support system  Outcome: Progressing

## 2025-03-02 LAB
BILIRUB UR QL: NEGATIVE
CLARITY UR: CLEAR
GLUCOSE UR-MCNC: NORMAL MG/DL
KETONES UR-MCNC: NEGATIVE MG/DL
LEUKOCYTE ESTERASE UR QL STRIP.AUTO: 250
MAGNESIUM SERPL-MCNC: 1.5 MG/DL (ref 1.6–2.6)
NITRITE UR QL STRIP.AUTO: NEGATIVE
PH UR: 6 [PH] (ref 5–8)
PROT UR-MCNC: NEGATIVE MG/DL
SP GR UR STRIP: 1.01 (ref 1–1.03)
UROBILINOGEN UR STRIP-ACNC: NORMAL

## 2025-03-02 PROCEDURE — 99232 SBSQ HOSP IP/OBS MODERATE 35: CPT | Performed by: HOSPITALIST

## 2025-03-02 RX ORDER — MAGNESIUM OXIDE 400 MG/1
400 TABLET ORAL 2 TIMES DAILY WITH MEALS
Status: DISCONTINUED | OUTPATIENT
Start: 2025-03-03 | End: 2025-03-04

## 2025-03-02 RX ORDER — MAGNESIUM OXIDE 400 MG/1
800 TABLET ORAL ONCE
Status: COMPLETED | OUTPATIENT
Start: 2025-03-02 | End: 2025-03-02

## 2025-03-02 RX ORDER — HYDROCODONE BITARTRATE AND ACETAMINOPHEN 7.5; 325 MG/1; MG/1
1 TABLET ORAL EVERY 4 HOURS PRN
Status: DISCONTINUED | OUTPATIENT
Start: 2025-03-02 | End: 2025-03-04

## 2025-03-02 NOTE — PLAN OF CARE
A/O x 4. Midline incision with staples, well approximated, clean/dry/intact. Abdominal IRIS drain leaking at site, serosanguinous output in bulb and leaking around insertion site. Ileostomy with mushy brown stool and flatus in bag. Tolerating low fiber diet with adequate PO intake. Norco managing pain. Up in chair all day and ambulating in room. Montoya in place. Pt updated on plan of care. Bed in lowest position, call light in reach, frequent rounding, nonskid footwear, fall precautions in place.

## 2025-03-02 NOTE — PROGRESS NOTES
[facility]  part of [facility]    Progress Note     Reina Patient Status:  Inpatient    2/10/1960 MRN [id number]   Location [facility] 4W/SW/SE Attending Ross Erickson MD   Hosp Day # 3 PCP TEE DWYER        Subjective:    Reina is a(n) 65 year old female     POD#2 s/p laparotomy, completion total proctocolectomy, intersphincteric proctectomy, end ileostomy    Pain moderate, perineal wound most uncomfortable, she had some discharge yesterday but none today  No oral intake yet  Sitting up in chair, has ambulated            Allergies/Medications:   Allergies: Allergies[1]   [COMPLETED] magnesium oxide (Mag-Ox) tab 800 mg  800 mg Oral Once    [START ON 3/3/2025] magnesium oxide (Mag-Ox) tab 400 mg  400 mg Oral BID with meals    HYDROcodone-acetaminophen (Norco) 7.5-325 MG per tab 1 tablet  1 tablet Oral Q4H PRN    [COMPLETED] magnesium oxide (Mag-Ox) tab 800 mg  800 mg Oral Once    [COMPLETED] sodium chloride 0.9 % IV bolus 1,000 mL  1,000 mL Intravenous Once    [COMPLETED] famotidine (Pepcid) tab 20 mg  20 mg Oral Once    Or    [COMPLETED] famotidine (Pepcid) 20 mg/2mL injection 20 mg  20 mg Intravenous Once    [COMPLETED] heparin (Porcine) 5000 UNIT/ML injection 5,000 Units  5,000 Units Subcutaneous Once    [COMPLETED] ceFAZolin (Ancef) 2g in 10mL IV syringe premix  2 g Intravenous Once    [COMPLETED] metroNIDAZOLE in sodium chloride 0.79% (Flagyl) 5 mg/mL IVPB premix 500 mg  500 mg Intravenous Once    bupivacaine liposome (Exparel) 1.3% injection 20 mL  20 mL Infiltration Once (Intra-Op)    heparin (Porcine) 5000 UNIT/ML injection 5,000 Units  5,000 Units Subcutaneous Q8H MATT    polyethylene glycol (PEG 3350) (Miralax) 17 g oral packet 17 g  17 g Oral Daily PRN    sennosides (Senokot) tab 17.2 mg  17.2 mg Oral Nightly PRN    famotidine (Pepcid) tab 20 mg  20 mg Oral Daily    Or    famotidine (Pepcid) 20 mg/2mL injection 20 mg  20 mg Intravenous Daily     HYDROmorphone (Dilaudid) 1 MG/ML injection 0.2 mg  0.2 mg Intravenous Q2H PRN    Or    HYDROmorphone (Dilaudid) 1 MG/ML injection 0.4 mg  0.4 mg Intravenous Q2H PRN    ondansetron (Zofran) 4 MG/2ML injection 4 mg  4 mg Intravenous Q6H PRN    [COMPLETED] ceFAZolin (Ancef) 2g in 10mL IV syringe premix  2 g Intravenous Q8H    [COMPLETED] ondansetron (Zofran) 4 MG/2ML injection 4 mg  4 mg Intravenous Once    [COMPLETED] metroNIDAZOLE in sodium chloride 0.79% (Flagyl) 5 mg/mL IVPB premix 500 mg  500 mg Intravenous Q8H    prochlorperazine (Compazine) 10 MG/2ML injection 10 mg  10 mg Intravenous Q6H PRN    [COMPLETED] acetaminophen (Ofirmev) 10 mg/mL infusion premix 1,000 mg  1,000 mg Intravenous Once           Objective:   Vital Signs:  Blood pressure 111/56, pulse 80, temperature 98.4 °F (36.9 °C), temperature source Oral, resp. rate 16, height 5' 3\" (1.6 m), weight 238 lb (108 kg), last menstrual period 06/30/2010, SpO2 92%.     I/O last 3 completed shifts:  In: 1273 [P.O.:277; I.V.:996]  Out: 1945 [Urine:1375; Drains:180; Stool:390]    General: No acute distress. Alert and oriented x 3.  HEENT: Moist mucous membranes. EOM-I. PERRL  Neck: No lymphadenopathy.  No JVD. No carotid bruits.  Respiratory: Clear to auscultation bilaterally.  No wheezes. No rhonchi.  Cardiovascular: S1, S2.  Regular rate and rhythm.  No murmurs. Equal pulses   Abdomen: Soft, nontender, nondistended.  Positive bowel sounds. No rebound tenderness  Stoma: viable, minimal stool and flatus noted  Incision(s): dressing clean, dry  Drain: serosang    Neurologic: No focal neurological deficits.  Musculoskeletal: Full range of motion of all extremities.  No swelling noted.  Integument: No lesions. No erythema.  Psychiatric: Appropriate mood and affect.        Assessment and Plan:     Rectal polyp    Ulcerative colitis    POD#3 s/p laparotomy, completion total proctocolectomy, intersphincteric proctectomy, end ileostomy    Expected progress, continue  ERAS  Colostomy working  Patient tolerating diet  Decision about Montoya tomorrow per Dr. Randall Osborn IRIS drain    CORI JOHNSON JR., MD. [facility]    3/2/2025  11:46 AM                    Results:     Lab Results   Component Value Date    WBC 11.9 (H) 03/01/2025    HGB 10.8 (L) 03/01/2025    HCT 34.1 (L) 03/01/2025    .0 03/01/2025    CREATSERUM 1.22 (H) 03/01/2025    BUN 15 03/01/2025     03/01/2025    K 4.2 03/01/2025     03/01/2025    CO2 27.0 03/01/2025     (H) 03/01/2025    CA 7.7 (L) 03/01/2025    ALB 3.1 (L) 02/17/2020    ALKPHO 97 02/17/2020    BILT 0.50 02/17/2020    TP 7.6 02/17/2020    AST 17 02/17/2020    ALT 21 02/17/2020    T4F 1.34 12/18/2015    TSH 2.730 12/18/2015    MG 1.5 (L) 03/02/2025    PHOS 4.7 02/28/2025       No results found.                      [1]   Allergies  Allergen Reactions    Cat Hair Extract SWELLING     Hay fever    Reglan [Metoclopramide] OTHER (SEE COMMENTS)     Causes pain in legs    Adhesive Tape RASH     blisters    Latex RASH

## 2025-03-02 NOTE — PHYSICAL THERAPY NOTE
PHYSICAL THERAPY TREATMENT NOTE - INPATIENT     Room Number: 446/446-A       Presenting Problem: Rectal poyp, open total proteotomy  Co-Morbidities : asthma, rectal polyp, obvesity, rectal mass, bladder CA, hypoxia    Problem List  Active Problems:    Preop testing    Rectal polyp      PHYSICAL THERAPY ASSESSMENT   Patient demonstrates steady progress this session, goals  remain in progress.      Patient is requiring contact guard assist as a result of the following impairments: decreased functional strength, decreased endurance/aerobic capacity, pain, impaired standing balance, decreased muscular endurance, medical status, and increased O2 needs from baseline.     Patient continues to function below baseline with bed mobility, transfers, gait, stair negotiation, standing prolonged periods, and performing household tasks.  Next session anticipate patient to progress bed mobility, transfers, gait, stair negotiation, standing prolonged periods, and performing household tasks.  Physical Therapy will continue to follow patient for duration of hospitalization.    Patient continues to benefit from continued skilled PT services: at discharge to promote prior level of function and safety with additional support and return home with home health PT.  Pt reports does not feel will need HH PT at [facility] .   HH PT needs at [facility] pending progress.   Pt with goal for DC home with her spouse and family reporting has good support system at home.  Pt currently using RW therefore may need RW for home use.   Therapy anticipates pt will progress to DC to home setting with 24hr support of family.     PLAN DURING HOSPITALIZATION  Nursing Mobility Recommendation : 1 Assist  PT Device Recommendation: Rolling walker  PT Treatment Plan: Bed mobility;Body mechanics;Endurance;Energy conservation;Patient education;Family education;Gait training;Balance training;Transfer training;Stair training  Frequency (Obs): Daily     SUBJECTIVE  Agreeable to  mobility  ,pain is well controlled.  Declining return to bed wanting to remain up in chair . Pt declining to leave room with cedeño in place as well.      OBJECTIVE  Precautions: Abdominal protective strategies;Drain(s)    WEIGHT BEARING RESTRICTION       PAIN ASSESSMENT   Ratin  Location: at rest no pain   pain during activity well controlled  Management Techniques: Activity promotion;Body mechanics;Breathing techniques;Relaxation;Repositioning    BALANCE  Static Sitting: Good  Dynamic Sitting: Not tested  Static Standing: Fair  Dynamic Standing: Fair -    ACTIVITY TOLERANCE  Pulse: 80 (resting     93)        BP: (!) 89/68 (resting   post activity 111/56)              O2 WALK  Oxygen Therapy  SPO2% on Oxygen at Rest: 96  At rest oxygen flow (liters per minute): 2  SPO2% Ambulation on Room Air: 88 (88-89--91   denies SOB)    AM-PAC '6-Clicks' INPATIENT SHORT FORM - BASIC MOBILITY  How much difficulty does the patient currently have...  Patient Difficulty: Turning over in bed (including adjusting bedclothes, sheets and blankets)?: A Little   Patient Difficulty: Sitting down on and standing up from a chair with arms (e.g., wheelchair, bedside commode, etc.): A Little   Patient Difficulty: Moving from lying on back to sitting on the side of the bed?: A Little   How much help from another person does the patient currently need...   Help from Another: Moving to and from a bed to a chair (including a wheelchair)?: A Little   Help from Another: Need to walk in hospital room?: A Little   Help from Another: Climbing 3-5 steps with a railing?: A Little     AM-PAC Score:  Raw Score: 18   Approx Degree of Impairment: 46.58%   Standardized Score (AM-PAC Scale): 43.63   CMS Modifier (G-Code): CK    FUNCTIONAL ABILITY STATUS  Functional Mobility/Gait Assessment  Gait Assistance:  (SBA for line)  Distance (ft): 240 ft x 1 in room   declined to leave room    Request ambulation without O2  Assistive Device: Rolling  walker  Pattern: R Steppage;L Steppage;R Foot flat;L Foot flat  Stairs:  pt appears with fxn strength to negotiate stairs yet declining to leave room due to cedeño in place . Pt reports she is able to march in place with RW as performing prior to therapy visit as has been unable to ambulate due to lines , Therapy encouraged ambulation with RN staff discouraged marching activity in place with RW at this time.           Rolling: declining to practice any bed mobility   therapy demonstration for proper ABD sparing fxn  bed mobility , pt stating would like to try tomorrow     Sit to Stand: stand-by assist    Skilled Therapy Provided:Education Provided To: Patient   Patient Education: Role of Physical Therapy;Plan of Care;Discharge Recommendations;DME Recommendations;Functional Transfer Techniques;Surgical Precautions;Posture/Positioning;Energy Conservation;Proper Body Mechanics;Abdominal Protective Strategies;Gait Training   Patient's Response to Education: Verbalized Understanding;Returned Demonstration;Requires Further Education      Pt education as above , fxn mobility training focus ABD sparing transfers and ambulation , B AP and DC Planning.     The patient's Approx Degree of Impairment: 46.58% has been calculated based on documentation in the [facility] '6 clicks' Inpatient Daily Activity Short Form.  Research supports that patients with this level of impairment may benefit from home with HHPT .  Final disposition will be made by interdisciplinary medical team.  Patient End of Session: Up in chair;Needs met;Call light within reach;RN aware of session/findings;All patient questions and concerns addressed    CURRENT GOALS   Goals to be met by: 3/15/2025  Patient Goal Patient's self-stated goal is: Return home    Goal #1 Patient is able to demonstrate supine - sit EOB @ level: independent      Goal #1   Current Status  declined    Goal #2 Patient is able to demonstrate transfers Sit to/from Stand at assistance level:  modified independent with walker - rolling      Goal #2  Current Status  SBA   Goal #3 Patient is able to ambulate 300 feet with assist device: walker - rolling at assistance level: modified independent   Goal #3   Current Status  in progress    Goal #4 Patient will negotiate 7 stairs/one curb w/ assistive device and supervision   Goal #4   Current Status  declined to leave room    Goal #5 Patient to demonstrate independence with home activity/exercise instructions provided to patient in preparation for discharge.   Goal #5   Current Status  in progress    Goal #6     Goal #6  Current Status     Therapy activity 2 units

## 2025-03-02 NOTE — PLAN OF CARE
Patient is alert and oriented. 2L NC. Vital signs stable. Pain managed with prn dilaudid. Zofran given prn for nausea. Colostomy in place with output. IRIS drain to bulb suction, leaking at site. Dressing changed as needed. Call light and personal belongings within reach. Safety precautions in place.  at bedside.     Problem: Patient Centered Care  Goal: Patient preferences are identified and integrated in the patient's plan of care  Description: Interventions:  - What would you like us to know as we care for you? I am overwhelmed by my ostomy  - Provide timely, complete, and accurate information to patient/family  - Incorporate patient and family knowledge, values, beliefs, and cultural backgrounds into the planning and delivery of care  - Encourage patient/family to participate in care and decision-making at the level they choose  - Honor patient and family perspectives and choices  Outcome: Progressing     Problem: Patient/Family Goals  Goal: Patient/Family Long Term Goal  Description: Patient's Long Term Goal: to go home    Interventions:  - ivf, iv abx, activity as tolerated, pain control  - See additional Care Plan goals for specific interventions  Outcome: Progressing  Goal: Patient/Family Short Term Goal  Description: Patient's Short Term Goal: to have pain controlled    Interventions:   - as needed pain medication, nonpharmacologic pain control  - See additional Care Plan goals for specific interventions  Outcome: Progressing     Problem: PAIN - ADULT  Goal: Verbalizes/displays adequate comfort level or patient's stated pain goal  Description: INTERVENTIONS:  - Encourage pt to monitor pain and request assistance  - Assess pain using appropriate pain scale  - Administer analgesics based on type and severity of pain and evaluate response  - Implement non-pharmacological measures as appropriate and evaluate response  - Consider cultural and social influences on pain and pain management  - Manage/alleviate  anxiety  - Utilize distraction and/or relaxation techniques  - Monitor for opioid side effects  - Notify MD/LIP if interventions unsuccessful or patient reports new pain  - Anticipate increased pain with activity and pre-medicate as appropriate  Outcome: Progressing     Problem: RISK FOR INFECTION - ADULT  Goal: Absence of fever/infection during anticipated neutropenic period  Description: INTERVENTIONS  - Monitor WBC  - Administer growth factors as ordered  - Implement neutropenic guidelines  Outcome: Progressing     Problem: SAFETY ADULT - FALL  Goal: Free from fall injury  Description: INTERVENTIONS:  - Assess pt frequently for physical needs  - Identify cognitive and physical deficits and behaviors that affect risk of falls.  - Hines fall precautions as indicated by assessment.  - Educate pt/family on patient safety including physical limitations  - Instruct pt to call for assistance with activity based on assessment  - Modify environment to reduce risk of injury  - Provide assistive devices as appropriate  - Consider OT/PT consult to assist with strengthening/mobility  - Encourage toileting schedule  Outcome: Progressing     Problem: DISCHARGE PLANNING  Goal: Discharge to home or other facility with appropriate resources  Description: INTERVENTIONS:  - Identify barriers to discharge w/pt and caregiver  - Include patient/family/discharge partner in discharge planning  - Arrange for needed discharge resources and transportation as appropriate  - Identify discharge learning needs (meds, wound care, etc)  - Arrange for interpreters to assist at discharge as needed  - Consider post-discharge preferences of patient/family/discharge partner  - Complete POLST form as appropriate  - Assess patient's ability to be responsible for managing their own health  - Refer to Case Management Department for coordinating discharge planning if the patient needs post-hospital services based on physician/LIP order or complex needs  related to functional status, cognitive ability or social support system  Outcome: Progressing

## 2025-03-02 NOTE — PROGRESS NOTES
Progress Note      Patient Status:  Inpatient    2/10/1960 MRN [id number]   Location [facility] 4W/SW/SE Attending Ross Erickson MD   Hosp Day # 3 PCP TEE DWYER     Chief Complaint: No chief complaint on file.        Subjective:   S: Patient seen and examined, chart reviewed.   S/P open total proctocolectomy with end ileostomy.  Doing well.    , but overall much better, still on IV dilaudid, worries about taking oxy (too strong), asking for norco instead  tolerating solid diet.   Brown stool in bag     at bedside     Review of Systems:   10 point ROS completed and was negative, except for pertinent positive and negatives stated in subjective.                Objective:   Vital signs:  Temp:  [98.4 °F (36.9 °C)-98.7 °F (37.1 °C)] 98.4 °F (36.9 °C)  Pulse:  [77-90] 80  Resp:  [16-18] 16  BP: ()/(52-68) 111/56  SpO2:  [89 %-93 %] 92 %    Wt Readings from Last 6 Encounters:   25 238 lb (108 kg)   24 276 lb (125.2 kg)   23 270 lb (122.5 kg)   20 300 lb (136.1 kg)   20 290 lb (131.5 kg)   18 300 lb (136.1 kg)       Physical Exam:    General: No acute distress.   Respiratory: Clear to auscultation bilaterally. No wheezes. No rhonchi.  Cardiovascular: S1, S2. Regular rate and rhythm. No murmurs, rubs or gallops.   Abdomen: Soft, nontender, nondistended.   staples ok, ileostomy bag in place, good stoma. Bowel sounds +.   Neurologic: No focal neurological deficits.   Musculoskeletal: Moves all extremities.  Extremities: No edema.    Results:   Diagnostic Data:      Labs:    Labs Last 24 Hours:   BMP     CBC    Other     Na - Cl - BUN - Glu -   Hb -   PTT - Procal -   K - CO2 - Cr -   WBC - >< PLT -  INR - CRP -   Renal Lytes Endo    Hct -   Trop - D dim -   eGFR - Ca - POC Gluc  -    LFT   pBNP - Lactic -   eGFR AA - PO4 - A1c -   AST - APk - Prot -  LDL -      Recent Labs   Lab 25  1212 25  0552 25  0719   RBC  5.46* 4.42 3.65*   HGB 15.2 13.0 10.8*   HCT 48.3* 40.3 34.1*   MCV 88.5 91.2 93.4   MCH 27.8 29.4 29.6   MCHC 31.5 32.3 31.7   RDW 13.5 13.6 13.7   NEPRELIM 7.07 15.24* 9.30*   WBC 10.7 18.4* 11.9*   .0 287.0 205.0       No results found for: \"PT\", \"INR\"    Recent Labs   Lab 02/25/25  1212 02/28/25  0552 03/01/25  0719   GLU 88 127* 113*   BUN 25* 19 15   CREATSERUM 1.34* 1.45* 1.22*   CA 9.2 7.6* 7.7*    143 141   K 4.4 4.9 4.2    112 109   CO2 26.0 26.0 27.0       No results for input(s): \"DELROY\", \"LIP\" in the last 168 hours.    Recent Labs   Lab 02/28/25  0552 03/01/25  0719 03/02/25  0704   MG 1.3* 1.4* 1.5*   PHOS 4.7  --   --        No results for input(s): \"URINE\", \"CULTI\", \"BLDSMR\" in the last 168 hours.      No results found.        Pro-Calcitonin  No results for input(s): \"PCT\" in the last 168 hours.    Cardiac  No results for input(s): \"TROP\", \"PBNP\" in the last 168 hours.    Imaging: Imaging data reviewed in Epic.    No results found.       Medications:    [START ON 3/3/2025] magnesium oxide  400 mg Oral BID with meals    bupivacaine liposome  20 mL Infiltration Once (Intra-Op)    heparin  5,000 Units Subcutaneous Q8H AMTT    famotidine  20 mg Oral Daily    Or    famotidine  20 mg Intravenous Daily       Assessment & Plan:   ASSESSMENT / PLAN:     High-grade dysplastic polyps with underlying ulcerative colitis, status post open total proctocolectomy with end ileostomy on 2/27/25 with Dr. 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 Recovering well  -Pain control.-->overall much better, still on IV dilaudid, worries about taking oxy (too strong), asking for norco instead, ordered  -Monitor surgical wound and drain.    -DVT prophylaxis, on hep subcutaneous per surgery  -Monitor hemodynamic status.       Morbid obesity.  Monitor respiratory and hemodynamic status.  Diabetes mellitus type 2.  Monitor Accu-Cheks, sliding scale  insulin.  Hypomagnesemia-->very low, Mag~1.3-->suppl  H/o UC  H/o nephrolithiasis  H/o HTN, current BP low, not on any BP meds, cont IVF until meaningful oral intake  H/o HL  CKD 2/3, Cr actually improving post op 1.45-->1.22, good urine output      dvt prophylaxis: sc heparin  code status: full code  dispo: home upon medical clearance       Discharge is dependent on: surgical clearance, tolerating diet and ambulating  At this point Ms. Huang is expected to be discharge to: home    Plan of care discussed with patient,  and nurse.      MDM risk:moderate.

## 2025-03-03 LAB
ANION GAP SERPL CALC-SCNC: 5 MMOL/L (ref 0–18)
BASOPHILS # BLD AUTO: 0.05 X10(3) UL (ref 0–0.2)
BASOPHILS NFR BLD AUTO: 0.5 %
BUN BLD-MCNC: 10 MG/DL (ref 9–23)
BUN/CREAT SERPL: 9.6 (ref 10–20)
CALCIUM BLD-MCNC: 8.2 MG/DL (ref 8.7–10.4)
CHLORIDE SERPL-SCNC: 106 MMOL/L (ref 98–112)
CO2 SERPL-SCNC: 30 MMOL/L (ref 21–32)
CREAT BLD-MCNC: 1.04 MG/DL
DEPRECATED RDW RBC AUTO: 44 FL (ref 35.1–46.3)
EGFRCR SERPLBLD CKD-EPI 2021: 60 ML/MIN/1.73M2 (ref 60–?)
EOSINOPHIL # BLD AUTO: 0.62 X10(3) UL (ref 0–0.7)
EOSINOPHIL NFR BLD AUTO: 5.7 %
ERYTHROCYTE [DISTWIDTH] IN BLOOD BY AUTOMATED COUNT: 13.2 % (ref 11–15)
GLUCOSE BLD-MCNC: 125 MG/DL (ref 70–99)
HCT VFR BLD AUTO: 36.1 %
HGB BLD-MCNC: 11.7 G/DL
IMM GRANULOCYTES # BLD AUTO: 0.07 X10(3) UL (ref 0–1)
IMM GRANULOCYTES NFR BLD: 0.6 %
LYMPHOCYTES # BLD AUTO: 2.26 X10(3) UL (ref 1–4)
LYMPHOCYTES NFR BLD AUTO: 20.9 %
MAGNESIUM SERPL-MCNC: 1.6 MG/DL (ref 1.6–2.6)
MCH RBC QN AUTO: 29.8 PG (ref 26–34)
MCHC RBC AUTO-ENTMCNC: 32.4 G/DL (ref 31–37)
MCV RBC AUTO: 91.9 FL
MONOCYTES # BLD AUTO: 0.79 X10(3) UL (ref 0.1–1)
MONOCYTES NFR BLD AUTO: 7.3 %
NEUTROPHILS # BLD AUTO: 7.03 X10 (3) UL (ref 1.5–7.7)
NEUTROPHILS # BLD AUTO: 7.03 X10(3) UL (ref 1.5–7.7)
NEUTROPHILS NFR BLD AUTO: 65 %
OSMOLALITY SERPL CALC.SUM OF ELEC: 293 MOSM/KG (ref 275–295)
PHOSPHATE SERPL-MCNC: 2.1 MG/DL (ref 2.4–5.1)
PLATELET # BLD AUTO: 293 10(3)UL (ref 150–450)
POTASSIUM SERPL-SCNC: 4 MMOL/L (ref 3.5–5.1)
RBC # BLD AUTO: 3.93 X10(6)UL
SODIUM SERPL-SCNC: 141 MMOL/L (ref 136–145)
WBC # BLD AUTO: 10.8 X10(3) UL (ref 4–11)

## 2025-03-03 PROCEDURE — 99232 SBSQ HOSP IP/OBS MODERATE 35: CPT | Performed by: HOSPITALIST

## 2025-03-03 RX ORDER — ONDANSETRON 4 MG/1
4 TABLET, FILM COATED ORAL EVERY 8 HOURS PRN
Status: DISCONTINUED | OUTPATIENT
Start: 2025-03-03 | End: 2025-03-03 | Stop reason: ALTCHOICE

## 2025-03-03 RX ORDER — ONDANSETRON 4 MG/1
8 TABLET, ORALLY DISINTEGRATING ORAL ONCE
Status: COMPLETED | OUTPATIENT
Start: 2025-03-03 | End: 2025-03-03

## 2025-03-03 RX ORDER — ONDANSETRON 4 MG/1
4 TABLET, ORALLY DISINTEGRATING ORAL EVERY 8 HOURS PRN
Status: DISCONTINUED | OUTPATIENT
Start: 2025-03-03 | End: 2025-03-04

## 2025-03-03 NOTE — PROGRESS NOTES
Progress Note      Patient Status:  Inpatient    2/10/1960 MRN [id number]   Location [facility] 4W/SW/SE Attending Ross Erickson MD   Hosp Day # 4 PCP TEE DWYER     Chief Complaint: No chief complaint on file.        Subjective:   S: Patient seen and examined, chart reviewed.   S/P open total proctocolectomy with end ileostomy.  Doing well.    off IV dilaudid, norco works well  tolerating solid diet.   Brown stool in bag  Episode of hematuria yesterday after keeping cedeño bag high on chair, resolved with repositioning and flushing, no recurrence     at bedside     Review of Systems:   10 point ROS completed and was negative, except for pertinent positive and negatives stated in subjective.                Objective:   Vital signs:  Temp:  [97.6 °F (36.4 °C)-98.4 °F (36.9 °C)] 98.4 °F (36.9 °C)  Pulse:  [83-93] 85  Resp:  [18-20] 18  BP: (106-125)/(51-71) 106/51  SpO2:  [90 %] 90 %    Wt Readings from Last 6 Encounters:   25 238 lb (108 kg)   24 276 lb (125.2 kg)   23 270 lb (122.5 kg)   20 300 lb (136.1 kg)   20 290 lb (131.5 kg)   18 300 lb (136.1 kg)       Physical Exam:    General: No acute distress.   Respiratory: Clear to auscultation bilaterally. No wheezes. No rhonchi.  Cardiovascular: S1, S2. Regular rate and rhythm. No murmurs, rubs or gallops.   Abdomen: Soft, nontender, nondistended.   staples ok, ileostomy bag in place, good stoma. Bowel sounds +.   Neurologic: No focal neurological deficits.   Musculoskeletal: Moves all extremities.  Extremities: No edema.    Results:   Diagnostic Data:      Labs:    Labs Last 24 Hours:   BMP     CBC    Other     Na 141 Cl 106 BUN 10 Glu 125   Hb 11.7   PTT - Procal -   K 4.0 CO2 30.0 Cr 1.04   WBC 10.8 >< .0  INR - CRP -   Renal Lytes Endo    Hct 36.1   Trop - D dim -   eGFR - Ca 8.2 POC Gluc  -    LFT   pBNP - Lactic -   eGFR AA - PO4 2.1 A1c -   AST - APk - Prot -  LDL -      Recent  Labs   Lab 02/28/25  0552 03/01/25  0719 03/03/25  0702   RBC 4.42 3.65* 3.93   HGB 13.0 10.8* 11.7*   HCT 40.3 34.1* 36.1   MCV 91.2 93.4 91.9   MCH 29.4 29.6 29.8   MCHC 32.3 31.7 32.4   RDW 13.6 13.7 13.2   NEPRELIM 15.24* 9.30* 7.03   WBC 18.4* 11.9* 10.8   .0 205.0 293.0       No results found for: \"PT\", \"INR\"    Recent Labs   Lab 02/28/25 0552 03/01/25  0719 03/03/25  0703   * 113* 125*   BUN 19 15 10   CREATSERUM 1.45* 1.22* 1.04*   CA 7.6* 7.7* 8.2*    141 141   K 4.9 4.2 4.0    109 106   CO2 26.0 27.0 30.0       No results for input(s): \"DELROY\", \"LIP\" in the last 168 hours.    Recent Labs   Lab 02/28/25  0552 03/01/25  0719 03/02/25  0704 03/03/25  0703   MG 1.3* 1.4* 1.5* 1.6   PHOS 4.7  --   --  2.1*       No results for input(s): \"URINE\", \"CULTI\", \"BLDSMR\" in the last 168 hours.      No results found.        Pro-Calcitonin  No results for input(s): \"PCT\" in the last 168 hours.    Cardiac  No results for input(s): \"TROP\", \"PBNP\" in the last 168 hours.    Imaging: Imaging data reviewed in Epic.    No results found.       Medications:    magnesium oxide  400 mg Oral BID with meals    bupivacaine liposome  20 mL Infiltration Once (Intra-Op)    heparin  5,000 Units Subcutaneous Q8H MATT    famotidine  20 mg Oral Daily    Or    famotidine  20 mg Intravenous Daily       Assessment & Plan:   ASSESSMENT / PLAN:     High-grade dysplastic polyps with underlying ulcerative colitis, status post open total proctocolectomy with end ileostomy on 2/27/25 with Dr. 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 Recovering well  -Pain control.-->overall much better, still on IV dilaudid, worries about taking oxy (too strong), asking for norco instead, ordered  -Monitor surgical wound and drain.    -DVT prophylaxis, on hep subcutaneous per surgery  -Monitor hemodynamic status.       Morbid obesity.  Monitor respiratory and hemodynamic  status.  Diabetes mellitus type 2.  Monitor Accu-Cheks, sliding scale insulin.  Hypomagnesemia-->very low, Mag~1.3-->suppl  H/o UC  H/o nephrolithiasis  H/o HTN, current BP low, not on any BP meds, cont IVF until meaningful oral intake  H/o HL  CKD 2/3, Cr actually improving post op 1.45-->1.22, good urine output      dvt prophylaxis: sc heparin  code status: full code  dispo: home upon medical clearance anticipate tomorrow       Discharge is dependent on: surgical clearance, tolerating diet and ambulating  At this point Ms. Huang is expected to be discharge to: home    Plan of care discussed with patient,  and nurse.      MDM risk:moderate.

## 2025-03-03 NOTE — PAYOR COMM NOTE
--------------  CONTINUED STAY REVIEW    Payor: [facility] CORE/NAVIGATE  Subscriber #:  [id number]  Authorization Number: [id number]    Admit date: 2/27/25  Admit time:  6:13 AM    Operative Note     Patient Name: April S Prettyman     Date of Surgery: 02/27/25      Preoperative Diagnosis: Ulcerative colitis     Postoperative Diagnosis: same      Procedures: Laparotomy, completion total proctocolectomy, intersphincteric proctectomy - extended effort     Surgical Findings: no metastatic disease, extensive intraabdominal adipose and difficult exposure for pelvic dissection      Specimen: colon, rectum and anus     Drains: 15 Fr Tesfaye drain in presacral space       2/28:    HOSPITALIST:    S/P open total proctocolectomy with end ileostomy.  Doing well.   but having some bowel gas movement and tolerating liquid diet.      Vital signs:  Temp:  [97.2 °F (36.2 °C)-98.1 °F (36.7 °C)] 98.1 °F (36.7 °C)  Pulse:  [] 103  Resp:  [13-20] 18  BP: ()/(44-78) 100/61  SpO2:  [87 %-100 %] 92 %    Physical Exam:    General: No acute distress.   Respiratory: Clear to auscultation bilaterally. No wheezes. No rhonchi.  Cardiovascular: S1, S2. Regular rate and rhythm. No murmurs, rubs or gallops.   Abdomen: Soft, nontender, nondistended.  IRIS drain in place, bandages c/d/I, staples ok, ileostomy bag in place, good stoma. Bowel sounds reduced.   Neurologic: No focal neurological deficits.   Musculoskeletal: Moves all extremities.  Extremities: No edema.     Lab 02/25/25  1212 02/28/25  0552   RBC 5.46* 4.42   HGB 15.2 13.0   HCT 48.3* 40.3   MCV 88.5 91.2   MCH 27.8 29.4   MCHC 31.5 32.3   RDW 13.5 13.6   NEPRELIM 7.07 15.24*   WBC 10.7 18.4*   .0 287.0      GLU 88 127*   BUN 25* 19   CREATSERUM 1.34* 1.45*   CA 9.2 7.6*    143   K 4.4 4.9    112   CO2 26.0 26.0      MG 1.3*   PHOS 4.7         Scheduled Medications    bupivacaine liposome  20 mL Infiltration Once (Intra-Op)    heparin  5,000 Units  Subcutaneous Q8H [facility]    famotidine  20 mg Oral Daily     Or    famotidine  20 mg Intravenous Daily    magnesium oxide  400 mg Oral Daily    insulin aspart  1-7 Units Subcutaneous TID CC         ASSESSMENT / PLAN:           High-grade dysplastic polyps with underlying ulcerative colitis, status post open total proctocolectomy with end ileostomy.  Pain control.  Monitor surgical wound and drain.  DVT prophylaxis.  Monitor hemodynamic status.  Clear liquid diet.  IV fluids. ADAT  2.       Morbid obesity.  Monitor respiratory and hemodynamic status.  3.       Diabetes mellitus type 2.  Monitor Accu-Cheks, sliding scale insulin.     SURGERY:    POD#1 s/p laparotomy, completion total proctocolectomy, intersphincteric proctectomy, end ileostomy     Pain moderate, sit <-> stand is most uncomfortable, controlled with IV Dilaudid  No oral intake yet, some nausea  Sitting up in chair, has ambulated       Rectal polyp    Ulcerative colitis     POD#1 s/p laparotomy, completion total proctocolectomy, intersphincteric proctectomy, end ileostomy     Expected progress, continue ERAS  Awaiting bowel function and diet tolerance  Pain moderate, controlled with IV dilaudid    3/1:    SURGERY:    POD#2 s/p laparotomy, completion total proctocolectomy, intersphincteric proctectomy, end ileostomy     Pain moderate, perineal wound most uncomfortable, she had some discharge yesterday but none today  No oral intake yet  Sitting up in chair, has ambulated    lactated ringers infusion  2 mL/kg/hr Intravenous Continuous     heparin (Porcine) 5000 UNIT/ML injection 5,000 Units  5,000 Units Subcutaneous Q8H [facility]    famotidine (Pepcid) tab 20 mg  20 mg Oral Daily     Or    famotidine (Pepcid) 20 mg/2mL injection 20 mg  20 mg Intravenous Daily    magnesium oxide (Mag-Ox) tab 400 mg  400 mg Oral Daily    [COMPLETED] ceFAZolin (Ancef) 2g in 10mL IV syringe premix  2 g Intravenous Q8H    [COMPLETED] ondansetron (Zofran) 4 MG/2ML injection 4 mg  4 mg  Intravenous Once    [COMPLETED] metroNIDAZOLE in sodium chloride 0.79% (Flagyl) 5 mg/mL IVPB premix 500 mg  500 mg Intravenous Q8H     Vital Signs:  Blood pressure 102/51, pulse 94, temperature 98.6 °F (37 °C), temperature source Oral, resp. rate 16, height 5' 3\" (1.6 m), weight 238 lb (108 kg), last menstrual period 06/30/2010, SpO2 90%.     I/O last 3 completed shifts:  In: 1714 [I.V.:1714]  Out: 1335 [Urine:1100; Drains:150; Stool:85]     General: No acute distress. Alert and oriented x 3.  HEENT: Moist mucous membranes. EOM-I. PERRL  Neck: No lymphadenopathy.  No JVD. No carotid bruits.  Respiratory: Clear to auscultation bilaterally.  No wheezes. No rhonchi.  Cardiovascular: S1, S2.  Regular rate and rhythm.  No murmurs. Equal pulses   Abdomen: Soft, nontender, nondistended.  Positive bowel sounds. No rebound tenderness  Stoma: viable, minimal stool and flatus noted  Incision(s): dressing clean, dry  Drain: serosang     Neurologic: No focal neurological deficits.  Musculoskeletal: Full range of motion of all extremities.  No swelling noted.  Integument: No lesions. No erythema.  Psychiatric: Appropriate mood and affect.     Assessment & Plan  Assessment and Plan:     Rectal polyp    Ulcerative colitis     POD#2 s/p laparotomy, completion total proctocolectomy, intersphincteric proctectomy, end ileostomy     Expected progress, continue ERAS  Awaiting bowel function and diet tolerance  Pain moderate, controlled with IV dilaudid     Lab Results   Component Value Date     WBC 11.9 (H) 03/01/2025     HGB 10.8 (L) 03/01/2025     HCT 34.1 (L) 03/01/2025     .0 03/01/2025     CREATSERUM 1.22 (H) 03/01/2025     BUN 15 03/01/2025      03/01/2025     K 4.2 03/01/2025      03/01/2025     CO2 27.0 03/01/2025      (H) 03/01/2025     CA 7.7 (L) 03/01/2025       3/2:     SURGERY:    Pain moderate, perineal wound most uncomfortable, she had some discharge yesterday but none today  No oral intake  yet  Sitting up in chair, has ambulated  Vital Signs:  Blood pressure 111/56, pulse 80, temperature 98.4 °F (36.9 °C), temperature source Oral, resp. rate 16, height 5' 3\" (1.6 m), weight 238 lb (108 kg), last menstrual period 06/30/2010, SpO2 92%.     I/O last 3 completed shifts:  In: 1273 [P.O.:277; I.V.:996]  Out: 1945 [Urine:1375; Drains:180; Stool:390]     General: No acute distress. Alert and oriented x 3.  HEENT: Moist mucous membranes. EOM-I. PERRL  Neck: No lymphadenopathy.  No JVD. No carotid bruits.  Respiratory: Clear to auscultation bilaterally.  No wheezes. No rhonchi.  Cardiovascular: S1, S2.  Regular rate and rhythm.  No murmurs. Equal pulses   Abdomen: Soft, nontender, nondistended.  Positive bowel sounds. No rebound tenderness  Stoma: viable, minimal stool and flatus noted  Incision(s): dressing clean, dry  Drain: serosang     Neurologic: No focal neurological deficits.  Musculoskeletal: Full range of motion of all extremities.  No swelling noted.  Integument: No lesions. No erythema.  Psychiatric: Appropriate mood and affect.     Assessment and Plan:     Rectal polyp    Ulcerative colitis     POD#3 s/p laparotomy, completion total proctocolectomy, intersphincteric proctectomy, end ileostomy     Expected progress, continue ERAS  Colostomy working  Patient tolerating diet  Decision about Montoya tomorrow per Dr. Pereira  Continue IRIS drain    MEDICATIONS ADMINISTERED IN LAST 1 DAY:    heparin (Porcine) 5000 UNIT/ML injection 5,000 Units       Date Action Dose Route User    3/3/2025 0539 Given 5,000 Units Subcutaneous (Right Upper Arm) Cassia Bonilla RN    3/2/2025 1455 Given 5,000 Units Subcutaneous (Left Upper Arm) Cortney Rome RN          HYDROcodone-acetaminophen (Norco) 7.5-325 MG per tab 1 tablet       Date Action Dose Route User    3/3/2025 1148 Given 1 tablet Oral Cortney Rome RN    3/3/2025 0641 Given 1 tablet Oral Cassia Bonilla RN    3/3/2025 0134 Given 1 tablet Oral Irene  SHIVA Antony    3/2/2025 2104 Given 1 tablet Oral Cassia Bonilla RN    3/2/2025 1538 Given 1 tablet Oral Cortney Rome RN          magnesium oxide (Mag-Ox) tab 400 mg       Date Action Dose Route User    3/3/2025 0923 Given 400 mg Oral Cortney Rome RN          ondansetron (Zofran-ODT) disintegrating tab 8 mg       Date Action Dose Route User    3/3/2025 0555 Given 8 mg Oral Cassia Bonilla RN          ondansetron (Zofran) 4 MG/2ML injection 4 mg       Date Action Dose Route User    3/2/2025 1738 Given 4 mg Intravenous Cortney Rome RN

## 2025-03-03 NOTE — PROGRESS NOTES
03/03/25 1430   Ileostomy Standard (Claudette, end) RUQ   Placement Date/Time: 02/27/25 1540   Inserted by: Dr. Pereira  Ileostomy Type: Standard (Claudette, end)  Location: RUQ  Stoma Size (cm): (c) 1.8 cm  Appliance Size: 1 3/4   Stomal Appliance 2 piece;Intact  (1 3/4 inch, pt. changing for teaching)   Stomal Assessment Pink;Edema;Moist;Red;Budding  (1 1/8 inches)   Peristomal Assessment Pink;Intact   Treatment Appliance change;Bag change;Site care  (1 3/4 inch 2 pc)   Dressing Change Due 03/10/25   Output (mL) 25 mL  (mushy brown stool)   Wound Follow Up   Follow up needed Yes     Ostomy Care  Follow up to continue ileostomy care teaching, she is up in the chair, she notes that she is comfortable with the pouch emptying. She removed her appliance, cleansed her skin, applied skin prep, measured her stoma and molded out the flange, she snapped the appliance together , she needed assist lining up the appliance with the stoma. She did a good job, the pt's spouse, daughter and sister are here for the education. The pt. has her supplies for home and her instructions. Spoke with the nurse.

## 2025-03-03 NOTE — PHYSICAL THERAPY NOTE
PHYSICAL THERAPY TREATMENT NOTE - INPATIENT     Room Number: 446/446-A       Presenting Problem: rectal polyp, open total proteotomy 25  Co-Morbidities : asthma, rectal polyp, obvesity, rectal mass, bladder CA, hypoxia    Problem List  Active Problems:    Preop testing    Rectal polyp      PHYSICAL THERAPY ASSESSMENT   Patient demonstrates excellent progress this session.    Patient is requiring independent, supervision, and stand-by assist.     Based on patient and family demonstrating good understanding of post operative protocol and safe functional mobility observed this date, no further skilled IP PT required at this time. RN made aware. Patient and family agreeable. Safe for DC to home from a PT standpoint.      PLAN DURING HOSPITALIZATION  Nursing Mobility Recommendation : 1 Assist  PT Device Recommendation: Rolling walker  PT Treatment Plan: Bed mobility;Body mechanics;Endurance;Energy conservation;Patient education;Family education;Gait training;Balance training;Transfer training;Stair training  Frequency (Obs): Daily     SUBJECTIVE  \"I don't need belt. I've been walking around without it\"     OBJECTIVE  Precautions: Abdominal protective strategies;Drain(s)      PAIN ASSESSMENT   Ratin  Location: buttocks  Management Techniques: Activity promotion;Body mechanics;Repositioning    BALANCE  Static Sitting: Normal  Dynamic Sitting: Normal  Static Standing: Good  Dynamic Standing: Good      AM-PAC '6-Clicks' INPATIENT SHORT FORM - BASIC MOBILITY  How much difficulty does the patient currently have...  Patient Difficulty: Turning over in bed (including adjusting bedclothes, sheets and blankets)?: None   Patient Difficulty: Sitting down on and standing up from a chair with arms (e.g., wheelchair, bedside commode, etc.): None   Patient Difficulty: Moving from lying on back to sitting on the side of the bed?: None   How much help from another person does the patient currently need...   Help from Another:  Moving to and from a bed to a chair (including a wheelchair)?: None   Help from Another: Need to walk in hospital room?: A Little   Help from Another: Climbing 3-5 steps with a railing?: A Little     AM-PAC Score:  Raw Score: 22   Approx Degree of Impairment: 20.91%   Standardized Score (AM-PAC Scale): 53.28   CMS Modifier (G-Code): CJ    FUNCTIONAL ABILITY STATUS  Functional Mobility/Gait Assessment  Gait Assistance: Supervision  Distance (ft): 300ft  Assistive Device: None  Pattern: Within Functional Limits  Stairs: Stairs  How Many Stairs: 8  Device: 2 Rails  Assist:  (SBA)  Pattern: Ascend and Descend  Ascend and Descend : Step to  Sit to Stand: independent    Skilled Therapy Provided: Patient received sitting in bedside chair with spouse present. RN approved activity.Therapist educated pt on POC and physiological benefits of mobilization. Cues for pacing and energy conservation techniques that can be applied to home environment. Patient agreeable to participate. Denies pain.     The patient's Approx Degree of Impairment: 20.91% has been calculated based on documentation in the [facility] '6 clicks' Inpatient Daily Activity Short Form.  Research supports that patients with this level of impairment may benefit from home.  Final disposition will be made by interdisciplinary medical team.      Patient End of Session: Up in chair;Needs met;RN aware of session/findings    CURRENT GOALS   Goals to be met by: 3/15/2025  Patient Goal Patient's self-stated goal is: Return home    Goal #1 Patient is able to demonstrate supine - sit EOB @ level: independent      Goal #1   Current Status  ongoing   Goal #2 Patient is able to demonstrate transfers Sit to/from Stand at assistance level: modified independent with walker - rolling      Goal #2  Current Status  GOAL MET 3/3   Goal #3 Patient is able to ambulate 300 feet with assist device: walker - rolling at assistance level: modified independent   Goal #3   Current Status   Supervision- safe for home    Goal #4 Patient will negotiate 7 stairs/one curb w/ assistive device and supervision   Goal #4   Current Status  SBA 8 stairs with HR   Goal #5 Patient to demonstrate independence with home activity/exercise instructions provided to patient in preparation for discharge.   Goal #5   Current Status  ongoing     Gait Training: 15 minutes  Therapeutic Activity: 8 minutes

## 2025-03-03 NOTE — PLAN OF CARE
Patient is alert and oriented. Room air. Vital signs stable. LFS diet. Ileostomy in place with output. IRIS drain to bulb suction - leaking at site, dressing changed as needed. Ambulating with SBA. Pain managed with prn norco. Patient had some bloody urine last night in cedeño bag. Notified Dr. De Paz. UA sent per orders. Patient having clear/yellow urine this AM. Call light and personal belongings within reach. Safety precautions in place.     Problem: Patient Centered Care  Goal: Patient preferences are identified and integrated in the patient's plan of care  Description: Interventions:  - What would you like us to know as we care for you? I am overwhelmed by my ostomy  - Provide timely, complete, and accurate information to patient/family  - Incorporate patient and family knowledge, values, beliefs, and cultural backgrounds into the planning and delivery of care  - Encourage patient/family to participate in care and decision-making at the level they choose  - Honor patient and family perspectives and choices  Outcome: Progressing     Problem: Patient/Family Goals  Goal: Patient/Family Long Term Goal  Description: Patient's Long Term Goal: to go home    Interventions:  - ivf, iv abx, activity as tolerated, pain control  - See additional Care Plan goals for specific interventions  Outcome: Progressing  Goal: Patient/Family Short Term Goal  Description: Patient's Short Term Goal: to have pain controlled    Interventions:   - as needed pain medication, nonpharmacologic pain control  - See additional Care Plan goals for specific interventions  Outcome: Progressing     Problem: PAIN - ADULT  Goal: Verbalizes/displays adequate comfort level or patient's stated pain goal  Description: INTERVENTIONS:  - Encourage pt to monitor pain and request assistance  - Assess pain using appropriate pain scale  - Administer analgesics based on type and severity of pain and evaluate response  - Implement non-pharmacological measures as  appropriate and evaluate response  - Consider cultural and social influences on pain and pain management  - Manage/alleviate anxiety  - Utilize distraction and/or relaxation techniques  - Monitor for opioid side effects  - Notify MD/LIP if interventions unsuccessful or patient reports new pain  - Anticipate increased pain with activity and pre-medicate as appropriate  Outcome: Progressing     Problem: RISK FOR INFECTION - ADULT  Goal: Absence of fever/infection during anticipated neutropenic period  Description: INTERVENTIONS  - Monitor WBC  - Administer growth factors as ordered  - Implement neutropenic guidelines  Outcome: Progressing     Problem: SAFETY ADULT - FALL  Goal: Free from fall injury  Description: INTERVENTIONS:  - Assess pt frequently for physical needs  - Identify cognitive and physical deficits and behaviors that affect risk of falls.  - [place] fall precautions as indicated by assessment.  - Educate pt/family on patient safety including physical limitations  - Instruct pt to call for assistance with activity based on assessment  - Modify environment to reduce risk of injury  - Provide assistive devices as appropriate  - Consider OT/PT consult to assist with strengthening/mobility  - Encourage toileting schedule  Outcome: Progressing     Problem: DISCHARGE PLANNING  Goal: Discharge to home or other facility with appropriate resources  Description: INTERVENTIONS:  - Identify barriers to discharge w/pt and caregiver  - Include patient/family/discharge partner in discharge planning  - Arrange for needed discharge resources and transportation as appropriate  - Identify discharge learning needs (meds, wound care, etc)  - Arrange for interpreters to assist at discharge as needed  - Consider post-discharge preferences of patient/family/discharge partner  - Complete POLST form as appropriate  - Assess patient's ability to be responsible for managing their own health  - Refer to Case Management Department  for coordinating discharge planning if the patient needs post-hospital services based on physician/LIP order or complex needs related to functional status, cognitive ability or social support system  Outcome: Progressing

## 2025-03-03 NOTE — PROGRESS NOTES
[facility]  part of [facility]    Progress Note    April S Reina Patient Status:  Inpatient    2/10/1960 MRN [id number]   Location [facility] 4W/SW/SE Attending Ross Erickson MD   Hosp Day # 4 PCP TEE DWYER        Subjective:   Amanda Huang is a(n) 65 year old female     POD#4 s/p laparotomy, completion total proctocolectomy, intersphincteric proctectomy, end ileostomy    Pain moderate, perineal wound most uncomfortable, managed with [place].  She not had perineal discharge since POD#1.  She is sitting up in chair, has ambulated    Colostomy is functional.  She has been tolerating food for the last couple meals, but has nausea and needed Zofran. The nausea seems to be similar to what she has experienced at home since she had colectomy in  and does not concern her.              Allergies/Medications:   Allergies: Allergies[1]   [COMPLETED] ondansetron (Zofran-ODT) disintegrating tab 8 mg  8 mg Oral Once    [COMPLETED] magnesium oxide (Mag-Ox) tab 800 mg  800 mg Oral Once    magnesium oxide (Mag-Ox) tab 400 mg  400 mg Oral BID with meals    HYDROcodone-acetaminophen (Norco) 7.5-325 MG per tab 1 tablet  1 tablet Oral Q4H PRN    [COMPLETED] magnesium oxide (Mag-Ox) tab 800 mg  800 mg Oral Once    [COMPLETED] sodium chloride 0.9 % IV bolus 1,000 mL  1,000 mL Intravenous Once    [COMPLETED] famotidine (Pepcid) tab 20 mg  20 mg Oral Once    Or    [COMPLETED] famotidine (Pepcid) 20 mg/2mL injection 20 mg  20 mg Intravenous Once    [COMPLETED] heparin (Porcine) 5000 UNIT/ML injection 5,000 Units  5,000 Units Subcutaneous Once    [COMPLETED] ceFAZolin (Ancef) 2g in 10mL IV syringe premix  2 g Intravenous Once    [COMPLETED] metroNIDAZOLE in sodium chloride 0.79% (Flagyl) 5 mg/mL IVPB premix 500 mg  500 mg Intravenous Once    bupivacaine liposome (Exparel) 1.3% injection 20 mL  20 mL Infiltration Once (Intra-Op)    heparin (Porcine) 5000 UNIT/ML injection 5,000 Units  5,000  Units Subcutaneous Q8H MATT    polyethylene glycol (PEG 3350) (Miralax) 17 g oral packet 17 g  17 g Oral Daily PRN    sennosides (Senokot) tab 17.2 mg  17.2 mg Oral Nightly PRN    famotidine (Pepcid) tab 20 mg  20 mg Oral Daily    Or    famotidine (Pepcid) 20 mg/2mL injection 20 mg  20 mg Intravenous Daily    HYDROmorphone (Dilaudid) 1 MG/ML injection 0.2 mg  0.2 mg Intravenous Q2H PRN    Or    HYDROmorphone (Dilaudid) 1 MG/ML injection 0.4 mg  0.4 mg Intravenous Q2H PRN    ondansetron (Zofran) 4 MG/2ML injection 4 mg  4 mg Intravenous Q6H PRN    [COMPLETED] ceFAZolin (Ancef) 2g in 10mL IV syringe premix  2 g Intravenous Q8H    [COMPLETED] ondansetron (Zofran) 4 MG/2ML injection 4 mg  4 mg Intravenous Once    [COMPLETED] metroNIDAZOLE in sodium chloride 0.79% (Flagyl) 5 mg/mL IVPB premix 500 mg  500 mg Intravenous Q8H    prochlorperazine (Compazine) 10 MG/2ML injection 10 mg  10 mg Intravenous Q6H PRN    [COMPLETED] acetaminophen (Ofirmev) 10 mg/mL infusion premix 1,000 mg  1,000 mg Intravenous Once           Objective:   Vital Signs:  Blood pressure 106/56, pulse 83, temperature 97.6 °F (36.4 °C), temperature source Temporal, resp. rate 20, height 5' 3\" (1.6 m), weight 238 lb (108 kg), last menstrual period 06/30/2010, SpO2 90%.     I/O last 3 completed shifts:  In: 1279 [P.O.:730; I.V.:549]  Out: 1935 [Urine:1350; Drains:110; Stool:475]    General: No acute distress. Alert and oriented x 3.  HEENT: Moist mucous membranes. EOM-I. PERRL  Neck: No lymphadenopathy.  No JVD. No carotid bruits.  Respiratory: Clear to auscultation bilaterally.  No wheezes. No rhonchi.  Cardiovascular: S1, S2.  Regular rate and rhythm.  No murmurs. Equal pulses   Abdomen: Soft, nontender, nondistended.  Positive bowel sounds. No rebound tenderness  Stoma: viable, minimal stool and flatus noted  Incision(s): midline incision clean, dry, may have superficial necrosis at umbilicus  Drain: serosang    Neurologic: No focal neurological  deficits.  Musculoskeletal: Full range of motion of all extremities.  No swelling noted.  Integument: No lesions. No erythema.  Psychiatric: Appropriate mood and affect.        Assessment and Plan:     Rectal polyp    Ulcerative colitis    POD#4 s/p laparotomy, completion total proctocolectomy, intersphincteric proctectomy, end ileostomy    Expected progress, continue ERAS  Pain moderate, controlled with [place]    Bowel function has returned  She has been having nausea but does not think this is different from her baseline  She has tolerated a diet for a few meals    Plan  discontinue Montoya  May be ready for discharge tomorrow if... voiding, continuing to tolerate diet, adequately trained and prepared for stoma care at home.  She will be discharged with drain in place.                Results:     Lab Results   Component Value Date    WBC 10.8 03/03/2025    HGB 11.7 (L) 03/03/2025    HCT 36.1 03/03/2025    .0 03/03/2025    CREATSERUM 1.04 (H) 03/03/2025    BUN 10 03/03/2025     03/03/2025    K 4.0 03/03/2025     03/03/2025    CO2 30.0 03/03/2025     (H) 03/03/2025    CA 8.2 (L) 03/03/2025    ALB 3.1 (L) 02/17/2020    ALKPHO 97 02/17/2020    BILT 0.50 02/17/2020    TP 7.6 02/17/2020    AST 17 02/17/2020    ALT 21 02/17/2020    T4F 1.34 12/18/2015    TSH 2.730 12/18/2015    MG 1.6 03/03/2025    PHOS 2.1 (L) 03/03/2025       No results found.               Manny Pereira MD  3/03/2025         [1]   Allergies  Allergen Reactions    Cat Hair Extract SWELLING     Hay fever    Reglan [Metoclopramide] OTHER (SEE COMMENTS)     Causes pain in legs    Adhesive Tape RASH     blisters    Latex RASH

## 2025-03-04 VITALS
BODY MASS INDEX: 42.17 KG/M2 | WEIGHT: 238 LBS | RESPIRATION RATE: 18 BRPM | HEIGHT: 63 IN | TEMPERATURE: 98 F | HEART RATE: 85 BPM | OXYGEN SATURATION: 85 % | SYSTOLIC BLOOD PRESSURE: 103 MMHG | DIASTOLIC BLOOD PRESSURE: 62 MMHG

## 2025-03-04 LAB
ANION GAP SERPL CALC-SCNC: 7 MMOL/L (ref 0–18)
BASOPHILS # BLD AUTO: 0.04 X10(3) UL (ref 0–0.2)
BASOPHILS NFR BLD AUTO: 0.4 %
BUN BLD-MCNC: 9 MG/DL (ref 9–23)
BUN/CREAT SERPL: 8.3 (ref 10–20)
CALCIUM BLD-MCNC: 8.4 MG/DL (ref 8.7–10.4)
CHLORIDE SERPL-SCNC: 105 MMOL/L (ref 98–112)
CO2 SERPL-SCNC: 32 MMOL/L (ref 21–32)
CREAT BLD-MCNC: 1.09 MG/DL
DEPRECATED RDW RBC AUTO: 43.7 FL (ref 35.1–46.3)
EGFRCR SERPLBLD CKD-EPI 2021: 56 ML/MIN/1.73M2 (ref 60–?)
EOSINOPHIL # BLD AUTO: 0.7 X10(3) UL (ref 0–0.7)
EOSINOPHIL NFR BLD AUTO: 7.1 %
ERYTHROCYTE [DISTWIDTH] IN BLOOD BY AUTOMATED COUNT: 13.4 % (ref 11–15)
GLUCOSE BLD-MCNC: 128 MG/DL (ref 70–99)
HCT VFR BLD AUTO: 35.5 %
HGB BLD-MCNC: 11.2 G/DL
IMM GRANULOCYTES # BLD AUTO: 0.08 X10(3) UL (ref 0–1)
IMM GRANULOCYTES NFR BLD: 0.8 %
LYMPHOCYTES # BLD AUTO: 1.92 X10(3) UL (ref 1–4)
LYMPHOCYTES NFR BLD AUTO: 19.4 %
MAGNESIUM SERPL-MCNC: 1.6 MG/DL (ref 1.6–2.6)
MCH RBC QN AUTO: 28.4 PG (ref 26–34)
MCHC RBC AUTO-ENTMCNC: 31.5 G/DL (ref 31–37)
MCV RBC AUTO: 90.1 FL
MONOCYTES # BLD AUTO: 0.74 X10(3) UL (ref 0.1–1)
MONOCYTES NFR BLD AUTO: 7.5 %
NEUTROPHILS # BLD AUTO: 6.41 X10 (3) UL (ref 1.5–7.7)
NEUTROPHILS # BLD AUTO: 6.41 X10(3) UL (ref 1.5–7.7)
NEUTROPHILS NFR BLD AUTO: 64.8 %
OSMOLALITY SERPL CALC.SUM OF ELEC: 298 MOSM/KG (ref 275–295)
PHOSPHATE SERPL-MCNC: 2.6 MG/DL (ref 2.4–5.1)
PLATELET # BLD AUTO: 302 10(3)UL (ref 150–450)
POTASSIUM SERPL-SCNC: 3.8 MMOL/L (ref 3.5–5.1)
RBC # BLD AUTO: 3.94 X10(6)UL
SODIUM SERPL-SCNC: 144 MMOL/L (ref 136–145)
WBC # BLD AUTO: 9.9 X10(3) UL (ref 4–11)

## 2025-03-04 PROCEDURE — 99239 HOSP IP/OBS DSCHRG MGMT >30: CPT | Performed by: HOSPITALIST

## 2025-03-04 RX ORDER — HYDROCODONE BITARTRATE AND ACETAMINOPHEN 7.5; 325 MG/1; MG/1
1 TABLET ORAL EVERY 4 HOURS PRN
Qty: 40 TABLET | Refills: 0 | Status: SHIPPED | OUTPATIENT
Start: 2025-03-04

## 2025-03-04 RX ORDER — MAGNESIUM OXIDE 400 MG/1
400 TABLET ORAL ONCE
Status: DISCONTINUED | OUTPATIENT
Start: 2025-03-04 | End: 2025-03-04

## 2025-03-04 RX ORDER — TIRZEPATIDE 15 MG/.5ML
1 INJECTION, SOLUTION SUBCUTANEOUS DAILY
Status: SHIPPED | COMMUNITY
Start: 2025-03-04

## 2025-03-04 RX ORDER — FAMOTIDINE 20 MG/1
20 TABLET, FILM COATED ORAL 2 TIMES DAILY PRN
Qty: 60 TABLET | Refills: 0 | Status: SHIPPED | OUTPATIENT
Start: 2025-03-04

## 2025-03-04 RX ORDER — DAPAGLIFLOZIN 10 MG/1
10 TABLET, FILM COATED ORAL DAILY
Status: SHIPPED | COMMUNITY
Start: 2025-03-04

## 2025-03-04 RX ORDER — ONDANSETRON 4 MG/1
4 TABLET, FILM COATED ORAL EVERY 8 HOURS PRN
Qty: 10 TABLET | Refills: 0 | Status: SHIPPED | OUTPATIENT
Start: 2025-03-04

## 2025-03-04 NOTE — PROGRESS NOTES
[facility]  part of [facility]    Progress Note     Reina Patient Status:  Inpatient    2/10/1960 MRN [id number]   Location [facility] 4W/SW/SE Attending Ross Erickson MD   Hosp Day # 5 PCP TEE DWYER        Subjective:    Reina is a(n) 65 year old female     POD#5 s/p laparotomy, completion total proctocolectomy, intersphincteric proctectomy, end ileostomy    Patient eager to go home, pain in perineal wound still present but adequately managed with norco. No reported discharge from perineal wound. Accompanied by     Ostomy: + Bm, + Flatus  -n/v/hb              Allergies/Medications:   Allergies: Allergies[1]   magnesium oxide (Mag-Ox) tab 400 mg  400 mg Oral Once    [COMPLETED] ondansetron (Zofran-ODT) disintegrating tab 8 mg  8 mg Oral Once    ondansetron (Zofran-ODT) disintegrating tab 4 mg  4 mg Oral Q8H PRN    [COMPLETED] magnesium oxide (Mag-Ox) tab 800 mg  800 mg Oral Once    magnesium oxide (Mag-Ox) tab 400 mg  400 mg Oral BID with meals    HYDROcodone-acetaminophen (Norco) 7.5-325 MG per tab 1 tablet  1 tablet Oral Q4H PRN    [COMPLETED] magnesium oxide (Mag-Ox) tab 800 mg  800 mg Oral Once    [COMPLETED] sodium chloride 0.9 % IV bolus 1,000 mL  1,000 mL Intravenous Once    [COMPLETED] famotidine (Pepcid) tab 20 mg  20 mg Oral Once    Or    [COMPLETED] famotidine (Pepcid) 20 mg/2mL injection 20 mg  20 mg Intravenous Once    [COMPLETED] heparin (Porcine) 5000 UNIT/ML injection 5,000 Units  5,000 Units Subcutaneous Once    [COMPLETED] ceFAZolin (Ancef) 2g in 10mL IV syringe premix  2 g Intravenous Once    [COMPLETED] metroNIDAZOLE in sodium chloride 0.79% (Flagyl) 5 mg/mL IVPB premix 500 mg  500 mg Intravenous Once    bupivacaine liposome (Exparel) 1.3% injection 20 mL  20 mL Infiltration Once (Intra-Op)    heparin (Porcine) 5000 UNIT/ML injection 5,000 Units  5,000 Units Subcutaneous Q8H MATT    polyethylene glycol (PEG 3350) (Miralax) 17 g  oral packet 17 g  17 g Oral Daily PRN    sennosides (Senokot) tab 17.2 mg  17.2 mg Oral Nightly PRN    famotidine (Pepcid) tab 20 mg  20 mg Oral Daily    Or    famotidine (Pepcid) 20 mg/2mL injection 20 mg  20 mg Intravenous Daily    HYDROmorphone (Dilaudid) 1 MG/ML injection 0.2 mg  0.2 mg Intravenous Q2H PRN    Or    HYDROmorphone (Dilaudid) 1 MG/ML injection 0.4 mg  0.4 mg Intravenous Q2H PRN    ondansetron (Zofran) 4 MG/2ML injection 4 mg  4 mg Intravenous Q6H PRN    [COMPLETED] ceFAZolin (Ancef) 2g in 10mL IV syringe premix  2 g Intravenous Q8H    [COMPLETED] ondansetron (Zofran) 4 MG/2ML injection 4 mg  4 mg Intravenous Once    [COMPLETED] metroNIDAZOLE in sodium chloride 0.79% (Flagyl) 5 mg/mL IVPB premix 500 mg  500 mg Intravenous Q8H    prochlorperazine (Compazine) 10 MG/2ML injection 10 mg  10 mg Intravenous Q6H PRN    [COMPLETED] acetaminophen (Ofirmev) 10 mg/mL infusion premix 1,000 mg  1,000 mg Intravenous Once           Objective:   Vital Signs:  Blood pressure 103/62, pulse 85, temperature 98.4 °F (36.9 °C), temperature source Oral, resp. rate 18, height 5' 3\" (1.6 m), weight 238 lb (108 kg), last menstrual period 06/30/2010, SpO2 (!) 85%.     I/O last 3 completed shifts:  In: 240 [P.O.:240]  Out: 1360 [Urine:875; Drains:210; Stool:275]    General: No acute distress. Alert and oriented x 3.  Abdomen: Soft, nontender, nondistended.  Positive bowel sounds. No rebound tenderness  Stoma: viable, adequate stool and flatus noted  Incision(s): midline incision clean, dry, intact. Staples in place  Drain: serous drainage noted    Neurologic: No focal neurological deficits.  Musculoskeletal: Full range of motion of all extremities.  No swelling noted.  Integument: No lesions. No erythema.  Psychiatric: Appropriate mood and affect.        Assessment and Plan:     Rectal polyp    Ulcerative colitis    POD#5 s/p laparotomy, completion total proctocolectomy, intersphincteric proctectomy, end ileostomy    Patient  doing well.  Montoya removed    Plan  Diet: Low residual  Ambulation  Pulmonary hygiene  Pain control  Drain teaching    Patient is okay for discharge from surgical perspective when okay with rest of hospital team.  Patient is to follow up with Dr. Manny Pereira in 10 days, on 3/14/25.  Instructions given and placed in chart and norco sent to pharmacy  All questions were asked and answered to the best of my ability.    D/w Randall              Results:     Lab Results   Component Value Date    WBC 9.9 03/04/2025    HGB 11.2 (L) 03/04/2025    HCT 35.5 03/04/2025    .0 03/04/2025    CREATSERUM 1.09 (H) 03/04/2025    BUN 9 03/04/2025     03/04/2025    K 3.8 03/04/2025     03/04/2025    CO2 32.0 03/04/2025     (H) 03/04/2025    CA 8.4 (L) 03/04/2025    ALB 3.1 (L) 02/17/2020    ALKPHO 97 02/17/2020    BILT 0.50 02/17/2020    TP 7.6 02/17/2020    AST 17 02/17/2020    ALT 21 02/17/2020    T4F 1.34 12/18/2015    TSH 2.730 12/18/2015    MG 1.6 03/04/2025    PHOS 2.6 03/04/2025       No results found.             At all points during my encounter with the patient my collaborating physician Dr. Manny Pereira  was available to answer questions and update the plan as necessary. The plan as stated previously is in agreement with the team.       Napoleon Denny PA-C  General Surgery  [facility]  03/04/25  2:43 PM           [1]   Allergies  Allergen Reactions    Cat Hair Extract SWELLING     Hay fever    Reglan [Metoclopramide] OTHER (SEE COMMENTS)     Causes pain in legs    Adhesive Tape RASH     blisters    Latex RASH

## 2025-03-04 NOTE — DISCHARGE INSTRUCTIONS
Dr. Manny Pereira Colon Resection Post-Op Instructions    Diet:    Low Residue Diet (low fiber diet) for 6 weeks.  If you develop nausea, stick to liquids until the nausea goes away.    Activity:    May walk and may climb stairs, avoid heavy lifting greater than 15 lbs, avoid bending or straining for 8 weeks.  No driving for 5 days and until you have stopped taking prescription pain medications.    You may sponge bath only until your post operative appointment.       Medications:    You may restart taking all your previous medications tonight at your regular time and dose unless instructed otherwise.      To manage pain you may use the following as a guideline:  Ice 2-3 times a day for 20-30 minute periods.  Tylenol - you may take extra strength Tylenol up to 1000 mg every 8 hours.  Do not exceed 4000 mg of Tylenol in a 24-hour period.  Prescription pain medication - only use for severe breakthrough pain. Please be aware that the prescription pain medication contains the equivalent to 1 dose of Tylenol. Do not exceed 4000mg of Tylenol in a 24 hour period    Please note, prescription pain medication can make you nauseated, bloated and constipated.  A stool softener will be sent to your pharmacy to help avoid constipation.  Examples of severe pain include, unable to sleep due to pain, or waking up in the middle of the night due to pain. Take it with food and discontinue if side effects occur. No alcohol or driving while taking prescription pain medications.     Do not take Aleve or Advil if allergic to them or if allergic to aspirin.    Dressing:   Place ice pack to operative site 3 times a day for the first 48 hours.     A drain was placed during your procedure. Please make sure this is maintained on collapsed suction until your office appointment. Also, please strip/milk the drain twice daily recording the outputs each time. The outputs should be measured in mL. Please bring the recordings to the next office  appointment.       All incisions become somewhat hard and sometimes lumpy. That is part of the normal healing process. Bruising around the incisions or lower is also normal and should resolve with time.     Low grade fever up to 101F is normal after surgery. Severe swelling, fever > 101.5, redness or drainage from wound should be reported to your surgeon. Call the office number during and after hours if needed.    Follow-up:      Call the office at [phone].  Make appointment to see Dr. Pereira in 3/14/25 following hospitalization.

## 2025-03-04 NOTE — PLAN OF CARE
Patient's pain managed with oxy. Oral Zofran given for nausea. No IV access, MC aware. L IRIS to bulb suction. R colostomy in place. Plan for home today. Safety precautions in place.

## 2025-03-04 NOTE — PLAN OF CARE
Problem: Patient Centered Care  Goal: Patient preferences are identified and integrated in the patient's plan of care  Description: Interventions:  - What would you like us to know as we care for you? I am overwhelmed by my ostomy  - Provide timely, complete, and accurate information to patient/family  - Incorporate patient and family knowledge, values, beliefs, and cultural backgrounds into the planning and delivery of care  - Encourage patient/family to participate in care and decision-making at the level they choose  - Honor patient and family perspectives and choices  Outcome: Adequate for Discharge     Problem: Patient/Family Goals  Goal: Patient/Family Long Term Goal  Description: Patient's Long Term Goal: to go home    Interventions:  - ivf, iv abx, activity as tolerated, pain control  - See additional Care Plan goals for specific interventions  Outcome: Adequate for Discharge  Goal: Patient/Family Short Term Goal  Description: Patient's Short Term Goal: to have pain controlled    Interventions:   - as needed pain medication, nonpharmacologic pain control  - See additional Care Plan goals for specific interventions  Outcome: Adequate for Discharge     Problem: PAIN - ADULT  Goal: Verbalizes/displays adequate comfort level or patient's stated pain goal  Description: INTERVENTIONS:  - Encourage pt to monitor pain and request assistance  - Assess pain using appropriate pain scale  - Administer analgesics based on type and severity of pain and evaluate response  - Implement non-pharmacological measures as appropriate and evaluate response  - Consider cultural and social influences on pain and pain management  - Manage/alleviate anxiety  - Utilize distraction and/or relaxation techniques  - Monitor for opioid side effects  - Notify MD/LIP if interventions unsuccessful or patient reports new pain  - Anticipate increased pain with activity and pre-medicate as appropriate  Outcome: Adequate for Discharge      Problem: RISK FOR INFECTION - ADULT  Goal: Absence of fever/infection during anticipated neutropenic period  Description: INTERVENTIONS  - Monitor WBC  - Administer growth factors as ordered  - Implement neutropenic guidelines  Outcome: Adequate for Discharge     Problem: SAFETY ADULT - FALL  Goal: Free from fall injury  Description: INTERVENTIONS:  - Assess pt frequently for physical needs  - Identify cognitive and physical deficits and behaviors that affect risk of falls.  - [place] fall precautions as indicated by assessment.  - Educate pt/family on patient safety including physical limitations  - Instruct pt to call for assistance with activity based on assessment  - Modify environment to reduce risk of injury  - Provide assistive devices as appropriate  - Consider OT/PT consult to assist with strengthening/mobility  - Encourage toileting schedule  Outcome: Adequate for Discharge     Problem: DISCHARGE PLANNING  Goal: Discharge to home or other facility with appropriate resources  Description: INTERVENTIONS:  - Identify barriers to discharge w/pt and caregiver  - Include patient/family/discharge partner in discharge planning  - Arrange for needed discharge resources and transportation as appropriate  - Identify discharge learning needs (meds, wound care, etc)  - Arrange for interpreters to assist at discharge as needed  - Consider post-discharge preferences of patient/family/discharge partner  - Complete POLST form as appropriate  - Assess patient's ability to be responsible for managing their own health  - Refer to Case Management Department for coordinating discharge planning if the patient needs post-hospital services based on physician/LIP order or complex needs related to functional status, cognitive ability or social support system  Outcome: Adequate for Discharge   Patient denies new complaints. Abdominal pain managed with Norco. Patient has been tolerating diet, denies nausea, stool present in stoma.  Patient does not want to change appliances today , states \" I am ok with changing at home \" . Left IRIS drain in place with serosanguinous output, patient demonstrated drain care. Patient is being discharge home accompanied by her . Discharge instructions discussed with patient she is aware of follow up visits and new medication regimen.

## 2025-03-04 NOTE — PLAN OF CARE
A/O x 4. Midline incision with staples, well approximated, clean/dry/intact. Abdominal IRIS drain leaking at site but improving with less leakage today. Ileostomy with mushy brown stool and flatus in bag, pt changed appliance and emptied ostomy herself. Tolerating low fiber diet with adequate PO intake. Denies nausea. Norco managing pain. Montoya removed per order, pt voiding freely. Up in chair all day and ambulating in room. Pt updated on plan of care. Bed in lowest position, call light in reach, frequent rounding, nonskid footwear, fall precautions in place.

## 2025-03-06 NOTE — PAYOR COMM NOTE
--------------  DISCHARGE REVIEW    Payor: [facility] CORE/NAVIGATE  Subscriber #:  [id number]  Authorization Number: [id number]    Admit date: 2/27/25  Admit time:   6:13 AM  Discharge Date: 3/4/2025  3:30 PM     Admitting Physician: Manny Pereira MD  Attending Physician:  No att. providers found  Primary Care Physician: Nata John

## 2025-03-08 ENCOUNTER — HOSPITAL ENCOUNTER (EMERGENCY)
Facility: HOSPITAL | Age: 65
Discharge: HOME | End: 2025-03-08
Attending: EMERGENCY MEDICINE
Payer: COMMERCIAL

## 2025-03-08 VITALS
HEIGHT: 63 IN | HEART RATE: 89 BPM | SYSTOLIC BLOOD PRESSURE: 143 MMHG | TEMPERATURE: 98 F | OXYGEN SATURATION: 96 % | BODY MASS INDEX: 40.75 KG/M2 | DIASTOLIC BLOOD PRESSURE: 87 MMHG | WEIGHT: 230 LBS | RESPIRATION RATE: 19 BRPM

## 2025-03-08 DIAGNOSIS — Z43.2 ILEOSTOMY BAG CHANGED (HCC): Primary | ICD-10-CM

## 2025-03-08 PROCEDURE — 99282 EMERGENCY DEPT VISIT SF MDM: CPT

## 2025-03-08 PROCEDURE — 99283 EMERGENCY DEPT VISIT LOW MDM: CPT

## 2025-03-09 NOTE — ED QUICK NOTES
Ostomy bag changed and instructed pt on proper way to change bag. Also instructed pt on how to dress wound for IRIS drain.

## 2025-03-09 NOTE — ED INITIAL ASSESSMENT (HPI)
Pt states surgery for ileostomy on 2/27. Pt reports released from hospital on Tuesday night and was told to change the dressing around the ileostomy today. Pt reports dressing is leaking everywhere and states she doesn't have any extra supplies at home to fix it.

## 2025-03-09 NOTE — ED PROVIDER NOTES
Patient Seen in: [facility] Emergency Department    History     Chief Complaint   Patient presents with    Wound Care       HPI    History is provided by patient/independent historian: patient  65 year old female with hx of DM, colon cancer s/p ileostomy placement, discharged 4 days ago, here with leaking around the dressing. She has tried several times to fix it, but it is still leaking and she ran out of extra supplies. No abdominal pain, it is still draining stool, no fevers. She denies any skin irritation.     History reviewed.   Past Medical History:    Adenomatous polyp of colon    ALLERGIC RHINITIS    Anxiety state    Calculus of kidney    Colitis    Colon cancer (HCC)    Diabetes (HCC)    Disorder of liver    fatty liver    High blood pressure    patient denies, states her BB is low    IBS (irritable bowel syndrome)    Neuropathy    OBESITY    OTHER DISEASES    ulcerative colitis in remission    Personal history of antineoplastic chemotherapy    PONV (postoperative nausea and vomiting)    SEASONAL ALLERGIES    Visual impairment    glasses         History reviewed.   Past Surgical History:   Procedure Laterality Date    Appendectomy      Cholecystectomy      Colectomy  2012    80% removed    Colonoscopy  4/18, 2/17, 7/15, 4/15,  4/14, 12/13, 5/13, 12, 03    chromoendoscopy (7/15, 2/17, 4/18)    Colonoscopy N/A 02/03/2017    Procedure: COLONOSCOPY;  Surgeon: Obie Rosa MD;  Location: [facility] ENDOSCOPY    Colonoscopy N/A 04/20/2018    Procedure: COLONOSCOPY;  Surgeon: Obie Rosa MD;  Location: [facility] ENDOSCOPY    Colonoscopy  02/2020    Colonoscopy N/A 02/20/2020    Procedure: COLONOSCOPY;  Surgeon: Obie Rosa MD;  Location: [facility] ENDOSCOPY    D & c      Removal of kidney stone      x2    Tonsillectomy           Home Medications reviewed :  Prescriptions Prior to Admission[1]      History reviewed.   Social History     Socioeconomic History    Marital status:    Tobacco Use     Smoking status: Never    Smokeless tobacco: Never   Vaping Use    Vaping status: Never Used   Substance and Sexual Activity    Alcohol use: Not Currently    Drug use: No         ROS  Review of Systems   Respiratory:  Negative for shortness of breath.    Cardiovascular:  Negative for chest pain.   Skin:  Positive for wound.   All other systems reviewed and are negative.     All other pertinent organ systems are reviewed and are negative.      Physical Exam     ED Triage Vitals [03/08/25 2103]   /87   Pulse 89   Resp 19   Temp 98.3 °F (36.8 °C)   Temp src Oral   SpO2 96 %   O2 Device None (Room air)     Vital signs reviewed.      Physical Exam  Vitals and nursing note reviewed.   Cardiovascular:      Pulses: Normal pulses.   Pulmonary:      Effort: No respiratory distress.   Abdominal:      General: There is no distension.      Tenderness: There is no abdominal tenderness.      Comments: Ileostomy with bag leaking, stoma pink and patent, staples in place over midline incision, IRIS drain site normal   Neurological:      Mental Status: She is alert.         ED Course       Labs:   Labs Reviewed - No data to display      My EKG Interpretation:   As reviewed and Interpreted by me      Imaging Results Available and Reviewed while in ED:   No results found.    Decision rules/scores evaluated: none      Diagnostic labs/tests considered but not ordered: CBC, BMP, aerobic culture, CT abd/pelvis    ED Medications Administered: Medications - No data to display         - ileostomy bag changed by RN    MDM       Medical Decision Making      Differential Diagnosis: After obtaining the patient's history, performing the physical exam and reviewing the diagnostics, multiple initial diagnoses were considered based on the presenting problem including cellulitis, ileostomy bag complication, dehiscence    External document review: I personally reviewed available external medical records for any recent pertinent discharge summaries,  testing, and procedures - the findings are as follows: 2/278/25 admission for proctocolectomy and end ileostomy    Complicating Factors: The patient already  has a past medical history of Adenomatous polyp of colon (05/2013), ALLERGIC RHINITIS, Anxiety state, Calculus of kidney, Colitis, Colon cancer (HCC) (01/2012), Diabetes (HCC), Disorder of liver, High blood pressure, IBS (irritable bowel syndrome), Neuropathy, OBESITY, OTHER DISEASES, Personal history of antineoplastic chemotherapy, PONV (postoperative nausea and vomiting), SEASONAL ALLERGIES, and Visual impairment. to contribute to the complexity of this ED evaluation.    Procedures performed: none    Discussed management with physician/appropriate source: none    Considered admission/deescalation of care for: none    Social determinants of health affecting patient care: none    Prescription medications considered: discussed continuing current medication regimen    The patient requires continuous monitoring for: ileostomy bag evaluation    Shared decision making: discussed possible admission            Disposition and Plan     Clinical Impression:  1. Ileostomy bag changed (HCC)        Disposition:  Discharge    Follow-up:  Manny Pereira MD  [address] [place] [address]  [address] [place] [id number]  [phone]    Follow up        Medications Prescribed:  Current Discharge Medication List                         [1] (Not in a hospital admission)

## 2025-03-10 ENCOUNTER — HOSPITAL ENCOUNTER (OUTPATIENT)
Facility: HOSPITAL | Age: 65
Setting detail: OBSERVATION
LOS: 1 days | Discharge: HOME HEALTH | End: 2025-03-11
Attending: EMERGENCY MEDICINE | Admitting: INTERNAL MEDICINE
Payer: COMMERCIAL

## 2025-03-10 DIAGNOSIS — Z43.2 ILEOSTOMY CARE (HCC): Primary | ICD-10-CM

## 2025-03-10 LAB
ANION GAP SERPL CALC-SCNC: 9 MMOL/L (ref 0–18)
BUN BLD-MCNC: 14 MG/DL (ref 9–23)
BUN/CREAT SERPL: 11.9 (ref 10–20)
CALCIUM BLD-MCNC: 8.7 MG/DL (ref 8.7–10.4)
CHLORIDE SERPL-SCNC: 106 MMOL/L (ref 98–112)
CO2 SERPL-SCNC: 27 MMOL/L (ref 21–32)
CREAT BLD-MCNC: 1.18 MG/DL
EGFRCR SERPLBLD CKD-EPI 2021: 51 ML/MIN/1.73M2 (ref 60–?)
GLUCOSE BLD-MCNC: 106 MG/DL (ref 70–99)
GLUCOSE BLDC GLUCOMTR-MCNC: 100 MG/DL (ref 70–99)
OSMOLALITY SERPL CALC.SUM OF ELEC: 295 MOSM/KG (ref 275–295)
POTASSIUM SERPL-SCNC: 4.4 MMOL/L (ref 3.5–5.1)
SODIUM SERPL-SCNC: 142 MMOL/L (ref 136–145)

## 2025-03-10 PROCEDURE — 99223 1ST HOSP IP/OBS HIGH 75: CPT | Performed by: HOSPITALIST

## 2025-03-10 RX ORDER — SODIUM PHOSPHATE, DIBASIC AND SODIUM PHOSPHATE, MONOBASIC 7; 19 G/230ML; G/230ML
1 ENEMA RECTAL ONCE AS NEEDED
Status: DISCONTINUED | OUTPATIENT
Start: 2025-03-10 | End: 2025-03-11

## 2025-03-10 RX ORDER — NICOTINE POLACRILEX 4 MG
30 LOZENGE BUCCAL
Status: DISCONTINUED | OUTPATIENT
Start: 2025-03-10 | End: 2025-03-11

## 2025-03-10 RX ORDER — HEPARIN SODIUM 5000 [USP'U]/ML
5000 INJECTION, SOLUTION INTRAVENOUS; SUBCUTANEOUS EVERY 8 HOURS SCHEDULED
Status: DISCONTINUED | OUTPATIENT
Start: 2025-03-11 | End: 2025-03-11

## 2025-03-10 RX ORDER — ACETAMINOPHEN 325 MG/1
650 TABLET ORAL EVERY 4 HOURS PRN
Status: DISCONTINUED | OUTPATIENT
Start: 2025-03-10 | End: 2025-03-11

## 2025-03-10 RX ORDER — NICOTINE POLACRILEX 4 MG
15 LOZENGE BUCCAL
Status: DISCONTINUED | OUTPATIENT
Start: 2025-03-10 | End: 2025-03-11

## 2025-03-10 RX ORDER — SENNOSIDES 8.6 MG
17.2 TABLET ORAL NIGHTLY PRN
Status: DISCONTINUED | OUTPATIENT
Start: 2025-03-10 | End: 2025-03-11

## 2025-03-10 RX ORDER — SODIUM CHLORIDE 9 MG/ML
INJECTION, SOLUTION INTRAVENOUS CONTINUOUS
Status: DISCONTINUED | OUTPATIENT
Start: 2025-03-11 | End: 2025-03-11

## 2025-03-10 RX ORDER — BISACODYL 10 MG
10 SUPPOSITORY, RECTAL RECTAL
Status: DISCONTINUED | OUTPATIENT
Start: 2025-03-10 | End: 2025-03-11

## 2025-03-10 RX ORDER — DEXTROSE MONOHYDRATE 25 G/50ML
50 INJECTION, SOLUTION INTRAVENOUS
Status: DISCONTINUED | OUTPATIENT
Start: 2025-03-10 | End: 2025-03-11

## 2025-03-10 RX ORDER — FAMOTIDINE 20 MG/1
20 TABLET, FILM COATED ORAL 2 TIMES DAILY PRN
Status: DISCONTINUED | OUTPATIENT
Start: 2025-03-10 | End: 2025-03-11

## 2025-03-10 RX ORDER — HYDROCODONE BITARTRATE AND ACETAMINOPHEN 5; 325 MG/1; MG/1
2 TABLET ORAL EVERY 4 HOURS PRN
Status: DISCONTINUED | OUTPATIENT
Start: 2025-03-10 | End: 2025-03-11

## 2025-03-10 RX ORDER — POLYETHYLENE GLYCOL 3350 17 G/17G
17 POWDER, FOR SOLUTION ORAL DAILY PRN
Status: DISCONTINUED | OUTPATIENT
Start: 2025-03-10 | End: 2025-03-11

## 2025-03-10 RX ORDER — HYDROCODONE BITARTRATE AND ACETAMINOPHEN 7.5; 325 MG/1; MG/1
1 TABLET ORAL EVERY 4 HOURS PRN
Status: DISCONTINUED | OUTPATIENT
Start: 2025-03-10 | End: 2025-03-11

## 2025-03-10 RX ORDER — ONDANSETRON 2 MG/ML
4 INJECTION INTRAMUSCULAR; INTRAVENOUS EVERY 6 HOURS PRN
Status: DISCONTINUED | OUTPATIENT
Start: 2025-03-10 | End: 2025-03-11

## 2025-03-10 RX ORDER — HYDROCODONE BITARTRATE AND ACETAMINOPHEN 5; 325 MG/1; MG/1
1 TABLET ORAL EVERY 4 HOURS PRN
Status: DISCONTINUED | OUTPATIENT
Start: 2025-03-10 | End: 2025-03-11

## 2025-03-10 NOTE — ED INITIAL ASSESSMENT (HPI)
Patient to ED for stoma complications-blistering, swelling and bleeding for s/p ileostomy on 02/272025. Patient states \"I am completely raw\". Patient is allergic to the ostomy adhesive products. Powder has not helped. Patient told by Dr. Pereira to come to ED for admission.

## 2025-03-11 VITALS
SYSTOLIC BLOOD PRESSURE: 115 MMHG | HEART RATE: 80 BPM | DIASTOLIC BLOOD PRESSURE: 58 MMHG | BODY MASS INDEX: 42.43 KG/M2 | RESPIRATION RATE: 18 BRPM | OXYGEN SATURATION: 95 % | TEMPERATURE: 98 F | HEIGHT: 63 IN | WEIGHT: 239.44 LBS

## 2025-03-11 LAB
ANION GAP SERPL CALC-SCNC: 9 MMOL/L (ref 0–18)
BASOPHILS # BLD AUTO: 0.06 X10(3) UL (ref 0–0.2)
BASOPHILS # BLD AUTO: 0.06 X10(3) UL (ref 0–0.2)
BASOPHILS NFR BLD AUTO: 0.5 %
BASOPHILS NFR BLD AUTO: 0.6 %
BUN BLD-MCNC: 10 MG/DL (ref 9–23)
BUN/CREAT SERPL: 8.3 (ref 10–20)
C DIFF TOX B STL QL: NEGATIVE
CALCIUM BLD-MCNC: 8.2 MG/DL (ref 8.7–10.4)
CHLORIDE SERPL-SCNC: 110 MMOL/L (ref 98–112)
CO2 SERPL-SCNC: 22 MMOL/L (ref 21–32)
CREAT BLD-MCNC: 1.21 MG/DL
DEPRECATED RDW RBC AUTO: 49.4 FL (ref 35.1–46.3)
DEPRECATED RDW RBC AUTO: 51.8 FL (ref 35.1–46.3)
EGFRCR SERPLBLD CKD-EPI 2021: 50 ML/MIN/1.73M2 (ref 60–?)
EOSINOPHIL # BLD AUTO: 0.92 X10(3) UL (ref 0–0.7)
EOSINOPHIL # BLD AUTO: 1.05 X10(3) UL (ref 0–0.7)
EOSINOPHIL NFR BLD AUTO: 8.7 %
EOSINOPHIL NFR BLD AUTO: 9.9 %
ERYTHROCYTE [DISTWIDTH] IN BLOOD BY AUTOMATED COUNT: 14.6 % (ref 11–15)
ERYTHROCYTE [DISTWIDTH] IN BLOOD BY AUTOMATED COUNT: 15.4 % (ref 11–15)
GLUCOSE BLD-MCNC: 100 MG/DL (ref 70–99)
GLUCOSE BLDC GLUCOMTR-MCNC: 117 MG/DL (ref 70–99)
GLUCOSE BLDC GLUCOMTR-MCNC: 88 MG/DL (ref 70–99)
HCT VFR BLD AUTO: 41.6 %
HCT VFR BLD AUTO: 42.7 %
HGB BLD-MCNC: 12.6 G/DL
HGB BLD-MCNC: 13 G/DL
IMM GRANULOCYTES # BLD AUTO: 0.06 X10(3) UL (ref 0–1)
IMM GRANULOCYTES # BLD AUTO: 0.08 X10(3) UL (ref 0–1)
IMM GRANULOCYTES NFR BLD: 0.6 %
IMM GRANULOCYTES NFR BLD: 0.7 %
LYMPHOCYTES # BLD AUTO: 1.94 X10(3) UL (ref 1–4)
LYMPHOCYTES # BLD AUTO: 1.97 X10(3) UL (ref 1–4)
LYMPHOCYTES NFR BLD AUTO: 16.3 %
LYMPHOCYTES NFR BLD AUTO: 20.9 %
MAGNESIUM SERPL-MCNC: 1.7 MG/DL (ref 1.6–2.6)
MCH RBC QN AUTO: 28.3 PG (ref 26–34)
MCH RBC QN AUTO: 29.1 PG (ref 26–34)
MCHC RBC AUTO-ENTMCNC: 29.5 G/DL (ref 31–37)
MCHC RBC AUTO-ENTMCNC: 31.3 G/DL (ref 31–37)
MCV RBC AUTO: 90.4 FL
MCV RBC AUTO: 98.6 FL
MONOCYTES # BLD AUTO: 0.61 X10(3) UL (ref 0.1–1)
MONOCYTES # BLD AUTO: 0.68 X10(3) UL (ref 0.1–1)
MONOCYTES NFR BLD AUTO: 5.6 %
MONOCYTES NFR BLD AUTO: 6.6 %
NEUTROPHILS # BLD AUTO: 5.7 X10 (3) UL (ref 1.5–7.7)
NEUTROPHILS # BLD AUTO: 5.7 X10(3) UL (ref 1.5–7.7)
NEUTROPHILS # BLD AUTO: 8.21 X10 (3) UL (ref 1.5–7.7)
NEUTROPHILS # BLD AUTO: 8.21 X10(3) UL (ref 1.5–7.7)
NEUTROPHILS NFR BLD AUTO: 61.4 %
NEUTROPHILS NFR BLD AUTO: 68.2 %
OSMOLALITY SERPL CALC.SUM OF ELEC: 291 MOSM/KG (ref 275–295)
PHOSPHATE SERPL-MCNC: 4 MG/DL (ref 2.4–5.1)
PLATELET # BLD AUTO: 285 10(3)UL (ref 150–450)
PLATELET # BLD AUTO: 372 10(3)UL (ref 150–450)
PLATELETS.RETICULATED NFR BLD AUTO: 1.3 % (ref 0–7)
POTASSIUM SERPL-SCNC: 4.2 MMOL/L (ref 3.5–5.1)
RBC # BLD AUTO: 4.33 X10(6)UL
RBC # BLD AUTO: 4.6 X10(6)UL
SODIUM SERPL-SCNC: 141 MMOL/L (ref 136–145)
WBC # BLD AUTO: 12.1 X10(3) UL (ref 4–11)
WBC # BLD AUTO: 9.3 X10(3) UL (ref 4–11)

## 2025-03-11 PROCEDURE — 99239 HOSP IP/OBS DSCHRG MGMT >30: CPT | Performed by: HOSPITALIST

## 2025-03-11 RX ORDER — MAGNESIUM OXIDE 400 MG/1
400 TABLET ORAL ONCE
Status: COMPLETED | OUTPATIENT
Start: 2025-03-11 | End: 2025-03-11

## 2025-03-11 NOTE — ED QUICK NOTES
Orders for admission, patient is aware of plan and ready to go upstairs. Any questions, please call ED RN tg at extension [phone].     Patient Covid vaccination status: Unvaccinated     COVID Test Ordered in ED: None    COVID Suspicion at Admission: N/A    Running Infusions:  None    Mental Status/LOC at time of transport: aox4    Other pertinent information:   CIWA score: N/A   NIH score:  N/A

## 2025-03-11 NOTE — H&P
[facility]    PATIENT'S NAME: MANUEL APRIL S   ATTENDING PHYSICIAN: Keyla Arevalo MD   PATIENT ACCOUNT#:   [id number]    LOCATION:  [address] [place] [address]  MEDICAL RECORD #:   [id number]       YOB: 1960  ADMISSION DATE:       03/10/2025    HISTORY AND PHYSICAL EXAMINATION    CHIEF COMPLAINT:  Ileostomy complication.    HISTORY OF PRESENT ILLNESS:  Patient is a 65-year-old female with a complicated past medical history of colon cancer with recent total proctocolectomy with end ileostomy.  Patient has come to the ER complaining of ongoing leakage from the bag and now she is having blistering of the skin because she is allergic to the adhesive.  Patient's surgeon, Dr. Pereira, was called who recommended that she can be admitted to the hospital for wound care.  Patient was seen and examined.  Denies any fevers, chills, headaches or blurred vision, palpitations, or nausea, vomiting, diarrhea.  She is endorsing ongoing leakage from the bag.    PAST MEDICAL HISTORY:  Positive for history of allergic rhinitis, colitis, history of colon cancer, diabetes, hypertension, irritable bowel disease, obesity.    PAST SURGICAL HISTORY:  Positive for appendectomy, cholecystectomy, colectomy, colonoscopy, D and C, and history of removal of kidney stone.    FAMILY HISTORY:  Positive for breast cancer in the mother and father has history of diabetes.    SOCIAL HISTORY:  Patient is a never smoker.  Denies any illicits or alcohol at this time.    REVIEW OF SYSTEMS:  A 10-point review of systems has been obtained and is otherwise negative.      PHYSICAL EXAMINATION:    GENERAL:  Patient lying in bed, appears to be in no acute distress at this time.  She is A and O x3.   VITAL SIGNS:  Patient's temperature is 98.2, pulse 86, respirations 19, blood pressure is 122/76, saturating 92% on room air.  HEENT:  Extraocular movements are intact.  Pupils equal, round, and reactive to light and accommodation.   Atraumatic, normocephalic.   LUNGS:  Good air entry bilaterally.   HEART:  S1 and S2 appreciated.   ABDOMEN:  Soft, nontender.  Patient does have a stoma which is pink and patent.  Patient does have some blisters surrounding the skin.  Does not appear to be infected at this time.  NEUROLOGIC:  No focal deficits noted at this time.   PSYCHIATRIC:  Appropriate affect.    LABORATORY DATA:  Patient's WBC is 12.1, hemoglobin is 13, hematocrit is 41.6, platelets 372.    ASSESSMENT AND PLAN:  Patient is a 65-year-old female with past medical history of colon cancer with history of ileostomy, who was admitted to the hospital for ongoing leakage from the bag as well as blistering of the skin.    1.   Ileostomy complication.  At this time, will have wound care consult placed.  Dr. Pereira has also been consulted.  Will Appreciate recommendations.  Will continue to monitor patient closely. Will have wound care placed on consult - patient appears to have an allergic reaction to the adhesive and will require education on ostomy care - will await further recs from surgery  2.   Leukocytosis.  Will continue to trend at this time.  Does not appear to be infected at this time but will continue monitor.  3.   History of anxiety, currently stable.   4.   History of diabetes.  Will start the patient on Accu-Cheks before meals and at bedtime.  Supplement with insulin sliding scale.  5.   Hypertension.  Will monitor closely.  6.   History of obesity.  Patient is on home medications at home.  Currently, will hold at this time.  7.   VTE prophylaxis will be heparin subcutaneously 5000 units b.i.d.   8.   Disposition:  At this time, we will continue to monitor closely.  The patient is a Full Code.  Further recommendations to follow.     Greater than 75 minutes was spent with greater than 50% of the time spent in face to face.     Dictated By Wu Plunkett MD  d: 03/10/2025 21:02:24  t: 03/10/2025  22:02:19  [facility] [id number]  [facility]/    cc: Keyla Arevalo MD

## 2025-03-11 NOTE — ED PROVIDER NOTES
Patient Seen in: [facility] Emergency Department    History     Chief Complaint   Patient presents with    Other     Ileostomy complication       HPI    History is provided by patient/independent historian: Patient  65 year old female with history of colon cancer with recent total proctocolectomy with end ileostomy, here with complaints of ileostomy history.  Patient states that she is still having ongoing leaking from the bag, now with blistering of the skin because she is allergic to the adhesive.  No fevers.  She called her surgeon who recommended she come in for admission for wound care.    History reviewed.   Past Medical History:    Adenomatous polyp of colon    ALLERGIC RHINITIS    Anxiety state    Calculus of kidney    Colitis    Colon cancer (HCC)    Diabetes (HCC)    Disorder of liver    fatty liver    High blood pressure    patient denies, states her BB is low    IBS (irritable bowel syndrome)    Neuropathy    OBESITY    OTHER DISEASES    ulcerative colitis in remission    Personal history of antineoplastic chemotherapy    PONV (postoperative nausea and vomiting)    SEASONAL ALLERGIES    Visual impairment    glasses         History reviewed.   Past Surgical History:   Procedure Laterality Date    Appendectomy      Cholecystectomy      Colectomy  2012    80% removed    Colonoscopy  4/18, 2/17, 7/15, 4/15,  4/14, 12/13, 5/13, 12, 03    chromoendoscopy (7/15, 2/17, 4/18)    Colonoscopy N/A 02/03/2017    Procedure: COLONOSCOPY;  Surgeon: Obie Rosa MD;  Location: [facility] ENDOSCOPY    Colonoscopy N/A 04/20/2018    Procedure: COLONOSCOPY;  Surgeon: Obie Rosa MD;  Location: [facility] ENDOSCOPY    Colonoscopy  02/2020    Colonoscopy N/A 02/20/2020    Procedure: COLONOSCOPY;  Surgeon: Obie Rosa MD;  Location: [facility] ENDOSCOPY    D & c      Removal of kidney stone      x2    Tonsillectomy           Home Medications reviewed :  Prescriptions Prior to Admission[1]      History reviewed.   Social  History     Socioeconomic History    Marital status:    Tobacco Use    Smoking status: Never    Smokeless tobacco: Never   Vaping Use    Vaping status: Never Used   Substance and Sexual Activity    Alcohol use: Not Currently    Drug use: No         ROS  Review of Systems   Respiratory:  Negative for shortness of breath.    Cardiovascular:  Negative for chest pain.   Skin:  Positive for wound.   All other systems reviewed and are negative.     All other pertinent organ systems are reviewed and are negative.      Physical Exam     ED Triage Vitals [03/10/25 1832]   /76   Pulse 86   Resp 19   Temp 98.2 °F (36.8 °C)   Temp src Oral   SpO2 92 %   O2 Device None (Room air)     Vital signs reviewed.      Physical Exam  Vitals and nursing note reviewed.   Cardiovascular:      Pulses: Normal pulses.   Pulmonary:      Effort: No respiratory distress.   Abdominal:      General: There is no distension.      Comments: Stoma pink and patent, surrounding skin with unroofed blisters of dermatitis without surrounding cellulitis   Neurological:      Mental Status: She is alert.         ED Course       Labs:     Labs Reviewed   CBC WITH DIFFERENTIAL WITH PLATELET - Abnormal; Notable for the following components:       Result Value    WBC 12.1 (*)     RDW-SD 49.4 (*)     RDW 15.4 (*)     Neutrophil Absolute Prelim 8.21 (*)     All other components within normal limits   BASIC METABOLIC PANEL (8)   SCAN SLIDE   MD BLOOD SMEAR CONSULT         My EKG Interpretation:   As reviewed and Interpreted by me      Imaging Results Available and Reviewed while in ED:   No results found.    Decision rules/scores evaluated: none      Diagnostic labs/tests considered but not ordered: CT abd/pelvis    ED Medications Administered: Medications - No data to display             MDM       Medical Decision Making      Differential Diagnosis: After obtaining the patient's history, performing the physical exam and reviewing the diagnostics,  multiple initial diagnoses were considered based on the presenting problem including ileostomy problem, cellulitis    External document review: I personally reviewed available external medical records for any recent pertinent discharge summaries, testing, and procedures - the findings are as follows: 3/8/25 visit with myself for ileostomy bag change    Complicating Factors: The patient already  has a past medical history of Adenomatous polyp of colon (05/2013), ALLERGIC RHINITIS, Anxiety state, Calculus of kidney, Colitis, Colon cancer (HCC) (01/2012), Diabetes ([facility]), Disorder of liver, High blood pressure, IBS (irritable bowel syndrome), Neuropathy, OBESITY, OTHER DISEASES, Personal history of antineoplastic chemotherapy, PONV (postoperative nausea and vomiting), SEASONAL ALLERGIES, and Visual impairment. to contribute to the complexity of this ED evaluation.    Procedures performed: none    Discussed management with physician/appropriate source: Dr. Arevalo, Dr. Pereira    Considered admission/deescalation of care for: ileostomy care    Social determinants of health affecting patient care: none    Prescription medications considered: discussed continuing current medication regimen    The patient requires continuous monitoring for: ileostomy bag leak    Shared decision making: discussed possible admission    Disposition and Plan     Clinical Impression:  1. Ileostomy care ([facility])        Disposition:  Admit    Follow-up:  No follow-up provider specified.    Medications Prescribed:  Current Discharge Medication List          Hospital Problems       Present on Admission  Date Reviewed: 2/25/2025            ICD-10-CM Noted POA    * (Principal) Ileostomy care ([facility]) Z43.2 3/10/2025 Unknown                     [1] (Not in a hospital admission)

## 2025-03-11 NOTE — DISCHARGE INSTRUCTIONS
Home Health :      [facility]    [address], [address]  [place] [address]  Phone: [phone]  Fax: [phone]

## 2025-03-11 NOTE — CONSULTS
[facility]  part of [facility]    Report of Consultation    Amanda Huang Patient Status:  Inpatient    2/10/1960 MRN [id number]   Location [facility] 5SW/SE Attending Mello Crowley MD   Hosp Day # 1 PCP TEE DWYER     Date of Admission:  3/10/2025  Date of Consult:  3/11/2025    I am seeing this patient on behalf of Dr. Manny Pereira who will examine the patient and adjust the plan accordingly.     Reason for Consultation:  Attention Colostomy       History of Present Illness:  Amanda Huang is a 65 year old female who presents with a PMH of colon cancer, recent total proctocolectomy with end ileostomy (25) by Dr. Pereira. The patient was admitted to the hospital during recovery until 3/4/25. Since her discharge she and her  have been managing her colostomy. Unfortunately, she presented to the ER with leakage from the bag along with irritation to her skin. She was admitted for observation and teaching. Patient denies any symptoms of infection. We are called for further evaluation.    History:  Past Medical History:    Adenomatous polyp of colon    ALLERGIC RHINITIS    Anxiety state    Calculus of kidney    Colitis    Colon cancer (HCC)    Diabetes (HCC)    Diabetes (HCC)    Disorder of liver    fatty liver    High blood pressure    patient denies, states her BB is low    IBS (irritable bowel syndrome)    Ileostomy in place (HCC)    Neuropathy    OBESITY    OTHER DISEASES    ulcerative colitis in remission    Personal history of antineoplastic chemotherapy    PONV (postoperative nausea and vomiting)    SEASONAL ALLERGIES    Visual impairment    glasses     Past Surgical History:   Procedure Laterality Date    Appendectomy      Cholecystectomy      Colectomy      80% removed    Colonoscopy  , , 7/15, 4/15,  , , ,     chromoendoscopy (7/15, , )    Colonoscopy N/A 2017    Procedure: COLONOSCOPY;  Surgeon: Obie Rosa MD;   Location: [facility] ENDOSCOPY    Colonoscopy N/A 04/20/2018    Procedure: COLONOSCOPY;  Surgeon: Obie Rosa MD;  Location: [facility] ENDOSCOPY    Colonoscopy  02/2020    Colonoscopy N/A 02/20/2020    Procedure: COLONOSCOPY;  Surgeon: Obie Rosa MD;  Location: [facility] ENDOSCOPY    D & c      Removal of kidney stone      x2    Tonsillectomy       Family History   Problem Relation Age of Onset    Diabetes Father     Cancer Mother         breast      reports that she has never smoked. She has never used smokeless tobacco. She reports that she does not currently use alcohol. She reports that she does not use drugs.    Allergies:  Allergies[1]    Medications:    Current Facility-Administered Medications:     trimethobenzamide (Tigan) 100 mg/mL injection 200 mg, 200 mg, Intramuscular, Q6H PRN    sodium chloride 0.9% infusion, , Intravenous, Continuous    heparin (Porcine) 5000 UNIT/ML injection 5,000 Units, 5,000 Units, Subcutaneous, Q8H MATT    acetaminophen (Tylenol) tab 650 mg, 650 mg, Oral, Q4H PRN **OR** HYDROcodone-acetaminophen (Norco) 5-325 MG per tab 1 tablet, 1 tablet, Oral, Q4H PRN **OR** HYDROcodone-acetaminophen (Norco) 5-325 MG per tab 2 tablet, 2 tablet, Oral, Q4H PRN    polyethylene glycol (PEG 3350) (Miralax) 17 g oral packet 17 g, 17 g, Oral, Daily PRN    sennosides (Senokot) tab 17.2 mg, 17.2 mg, Oral, Nightly PRN    bisacodyl (Dulcolax) 10 MG rectal suppository 10 mg, 10 mg, Rectal, Daily PRN    fleet enema (Fleet) rectal enema 133 mL, 1 enema, Rectal, Once PRN    melatonin tab 3 mg, 3 mg, Oral, Nightly PRN    famotidine (Pepcid) tab 20 mg, 20 mg, Oral, BID PRN    HYDROcodone-acetaminophen (Norco) 7.5-325 MG per tab 1 tablet, 1 tablet, Oral, Q4H PRN    glucose (Dex4) 15 GM/59ML oral liquid 15 g, 15 g, Oral, Q15 Min PRN **OR** glucose (Glutose) 40% oral gel 15 g, 15 g, Oral, Q15 Min PRN **OR** glucose-vitamin C (Dex-4) chewable tab 4 tablet, 4 tablet, Oral, Q15 Min PRN **OR** dextrose 50% injection 50 mL,  50 mL, Intravenous, Q15 Min PRN **OR** glucose (Dex4) 15 GM/59ML oral liquid 30 g, 30 g, Oral, Q15 Min PRN **OR** glucose (Glutose) 40% oral gel 30 g, 30 g, Oral, Q15 Min PRN **OR** glucose-vitamin C (Dex-4) chewable tab 8 tablet, 8 tablet, Oral, Q15 Min PRN    insulin aspart (NovoLOG) 100 Units/mL FlexPen 1-7 Units, 1-7 Units, Subcutaneous, TID CC    influenza virus trivalent high dose PF (Fluzone HD) 0.5 mL injection (ages >/= 65 years) 0.5 mL, 0.5 mL, Intramuscular, Prior to discharge    Review of Systems:  Pertinent items are noted in HPI.    Physical Exam:  Blood pressure 91/72, pulse 81, temperature 98.4 °F (36.9 °C), temperature source Oral, resp. rate 18, height 5' 3\" (1.6 m), weight 239 lb 6.7 oz (108.6 kg), last menstrual period 06/30/2010, SpO2 94%.    General appearance:  alert, appears stated age, cooperative, no distress, and morbidly obese  Abdominal: soft, non-tender; bowel sounds normal; no masses,  no organomegaly and staples in place over laparotomy incision. Some superficial necrosis noted at umbilicus minimal serous drainage.   Ostomy: In place and functioning. There is brown liquid stool, and gas present. There is superficial dermatitis surrounding the appliance. No evidence of cellulitis, no crepitus noted. No tracking. The skin has protected applied near appliance and is less inflamed according to the patient.     Results:  Lab Results   Component Value Date    WBC 9.3 03/11/2025    HGB 12.6 03/11/2025    HCT 42.7 03/11/2025    .0 03/11/2025    CREATSERUM 1.21 (H) 03/11/2025    BUN 10 03/11/2025     03/11/2025    K 4.2 03/11/2025     03/11/2025    CO2 22.0 03/11/2025     (H) 03/11/2025    CA 8.2 (L) 03/11/2025    ALB 3.1 (L) 02/17/2020    ALKPHO 97 02/17/2020    BILT 0.50 02/17/2020    TP 7.6 02/17/2020    AST 17 02/17/2020    ALT 21 02/17/2020    T4F 1.34 12/18/2015    TSH 2.730 12/18/2015    MG 1.7 03/11/2025    PHOS 4.0 03/11/2025       No results found.           Impression and Plan:  Patient Active Problem List   Diagnosis    Cervical polyp    Obese    Allergic rhinitis, cause unspecified    Asthma (HCC)    Abscess, axilla    History of colon cancer    Ulcerative pancolitis with complication (HCC)    Vitamin D deficiency    History of adenomatous polyp of colon    Preop testing    Rectal polyp    Ileostomy care (HCC)    Diabetes (HCC)       This is a 65 year old female with a history of colon cancer. She recently underwent total proctocolectomy with end ileostomy by Dr. Pereira on 2/27/25. She was admitted for postoperative care from 2/27/25-3/4/25, and returned to our service with leakage and skin irritation. After extensive workup the patient does not appear to have an active infection. After skin protectant applied pain and irritation has greatly improved.     At this time would recommend WOCN evaluation for continued education. Will also recommend  for assistance of home health nursing to help with continued outpatient education and stoma changes. Patient will follow up in office as previously scheduled for staple removal and continued care. If above can be coordinated okay for discharge from surgical perspective later today.     Plan  Diet: Carb controlled  WOCN for continued education and applies  Localized wound care to ostomy   for home health       At all points during my encounter with the patient my collaborating physician Dr. Manny Pereira  was available to answer questions and update the plan as necessary. The plan as stated previously is in agreement with the team.       Time spent in direct patient contact and decision making as well as counseling/coordination of care:    09330- 60 min    Napoleon Denny PA-C  General Surgery  [facility]  03/11/25  9:20 AM               [1]   Allergies  Allergen Reactions    Cat Hair Extract SWELLING     Hay fever    Reglan [Metoclopramide] OTHER (SEE COMMENTS)     Causes pain in  legs    Adhesive Tape RASH     blisters    Latex RASH

## 2025-03-11 NOTE — PAYOR COMM NOTE
--------------  ADMISSION REVIEW    Payor: [facility] CORE/JESSIATE  Subscriber #:  [id number]  Authorization Number: [id number]    Admit date: 3/10/25  Admit time:  9:59 PM       REVIEW DOCUMENTATION:      PATIENT IS ADMITTED OBSERVATION,   PLEASE DISREGARD/CANCEL INPATIENT AUTH REQUEST

## 2025-03-11 NOTE — PLAN OF CARE
Patient discharged to home with transportation provided by daughter. PIV removed. AVS reviewed with patient/family with all questions answered. All patient belongings returned at discharge.     Problem: Patient Centered Care  Goal: Patient preferences are identified and integrated in the patient's plan of care  Description: Interventions:  - What would you like us to know as we care for you? From home with   - Provide timely, complete, and accurate information to patient/family  - Incorporate patient and family knowledge, values, beliefs, and cultural backgrounds into the planning and delivery of care  - Encourage patient/family to participate in care and decision-making at the level they choose  - Honor patient and family perspectives and choices  Outcome: Adequate for Discharge     Problem: Diabetes/Glucose Control  Goal: Glucose maintained within prescribed range  Description: INTERVENTIONS:  - Monitor Blood Glucose as ordered  - Assess for signs and symptoms of hyperglycemia and hypoglycemia  - Administer ordered medications to maintain glucose within target range  - Assess barriers to adequate nutritional intake and initiate nutrition consult as needed  - Instruct patient on self management of diabetes  Outcome: Adequate for Discharge     Problem: Patient/Family Goals  Goal: Patient/Family Long Term Goal  Description: Patient's Long Term Goal: Discharge from the hospital    Interventions:  - Monitor vital signs  - Monitor appropriate labs  - Monitor blood glucose levels  - Pain management  - Wound and ostomy care  - Administer medications per order  - Follow MD orders  - Diagnostics per order  - Update / inform patient and family on plan of care  - Discharge planning  - See additional Care Plan goals for specific interventions  Outcome: Adequate for Discharge  Goal: Patient/Family Short Term Goal  Description: Patient's Short Term Goal: Improve excoriation and redness of skin surrounding stoma. Prevent  leakage from ileostomy appliance.     Interventions:   - Monitor vital signs  - Monitor appropriate labs  - Monitor blood glucose levels  - Pain management  - Wound and ostomy care  - Administer medications per order  - Follow MD orders  - Diagnostics per order  - Update / inform patient and family on plan of care  - See additional Care Plan goals for specific interventions  Outcome: Adequate for Discharge     Problem: PAIN - ADULT  Goal: Verbalizes/displays adequate comfort level or patient's stated pain goal  Description: INTERVENTIONS:  - Encourage pt to monitor pain and request assistance  - Assess pain using appropriate pain scale  - Administer analgesics based on type and severity of pain and evaluate response  - Implement non-pharmacological measures as appropriate and evaluate response  - Consider cultural and social influences on pain and pain management  - Manage/alleviate anxiety  - Utilize distraction and/or relaxation techniques  - Monitor for opioid side effects  - Notify MD/LIP if interventions unsuccessful or patient reports new pain  - Anticipate increased pain with activity and pre-medicate as appropriate  Outcome: Adequate for Discharge     Problem: SAFETY ADULT - FALL  Goal: Free from fall injury  Description: INTERVENTIONS:  - Assess pt frequently for physical needs  - Identify cognitive and physical deficits and behaviors that affect risk of falls.  - [place] fall precautions as indicated by assessment.  - Educate pt/family on patient safety including physical limitations  - Instruct pt to call for assistance with activity based on assessment  - Modify environment to reduce risk of injury  - Provide assistive devices as appropriate  - Consider OT/PT consult to assist with strengthening/mobility  - Encourage toileting schedule  Outcome: Adequate for Discharge     Problem: DISCHARGE PLANNING  Goal: Discharge to home or other facility with appropriate resources  Description: INTERVENTIONS:  -  Identify barriers to discharge w/pt and caregiver  - Include patient/family/discharge partner in discharge planning  - Arrange for needed discharge resources and transportation as appropriate  - Identify discharge learning needs (meds, wound care, etc)  - Arrange for interpreters to assist at discharge as needed  - Consider post-discharge preferences of patient/family/discharge partner  - Complete POLST form as appropriate  - Assess patient's ability to be responsible for managing their own health  - Refer to Case Management Department for coordinating discharge planning if the patient needs post-hospital services based on physician/LIP order or complex needs related to functional status, cognitive ability or social support system  Outcome: Adequate for Discharge     Problem: SKIN/TISSUE INTEGRITY - ADULT  Goal: Incision(s), wounds(s) or drain site(s) healing without S/S of infection  Description: INTERVENTIONS:  - Assess and document risk factors for pressure ulcer development  - Assess and document skin integrity  - Assess and document dressing/incision, wound bed, drain sites and surrounding tissue  - Implement wound care per orders  - Initiate isolation precautions as appropriate  - Initiate Pressure Ulcer prevention bundle as indicated  Outcome: Adequate for Discharge

## 2025-03-11 NOTE — CM/SW NOTE
03/11/25 [facility]   TONI/NOELLE Referral Data   Referral Source Physician   Reason for Referral Discharge planning;Readmission   Informant Patient;EMR;Clinical Staff Member   Readmission Assessment   Factors that patient feels contributed to this readmission Acute/Chronic Clinical Presentation   Pt's living situation prior to admission? Home with family   Pt's level of independence at discharge? No assist/independent (minimal)   Pt. received education on diagnoses at time of discharge? Yes   Did you know who and how to call someone if you felt worse? Yes   Did any new symptoms or issues develop after you were discharged? Yes   ----Post D/C symptoms: Symptoms/issue related to previous hospitalization Yes   Did you understand your discharge instructions? Yes   Were medications taken as indicated on discharge instructions? Yes   Did you have a follow-up appointment scheduled at time of discharge? Yes   ----F/U appt: Did you go to that appointment? Yes   ---- F/U appt: How many days after discharge was follow-up appointment? 0-14 days   Was full assessment completed by NOELLE/TONI on prior admission? No (comment)   Was the recommended discharge plan achieved? No   Was pt. discharged w/out services? Yes   Medical Hx   Does patient have an established PCP? Yes  (Dr. Nata John)   Significant Past Medical/Mental Health Hx DMll; colon cancer   Patient Info   Advanced directives? No   Patient's Current Mental Status at Time of Assessment Alert;Oriented   Patient's Home Environment House   Number of Levels in Home 2   Number of Stair in Home 7 to bedroom   Patient lives with Spouse/Significant other   Patient Status Prior to Admission   Independent with ADLs and Mobility Yes   Discharge Needs   Anticipated D/C needs Home health care   Choice of Post-Acute Provider   Informed patient of right to choose their preferred provider Yes     NOELLE received MD order for [facility] RN-  ostomy.      Pt had ileostomy placed w/ Dr. Pereira 2/27/25.  Pt was  discharged without home health care.      NOELLE sent [facility] referrals in Aidin.  Face to face complete and uploaded to referral.    NOELLE met with pt bedside to complete assessment.    Pt confirmed address and PCP on file.    Pt is from home w/her spouse, independent with ADLs and mobility at baseline.    NOELLE explained [facility] RN service.  Pt's daughter Marilyn joined call via phone.    Pt agreeable to [facility]- liaison Daniel confirmed they can provide SOC tomorrow 3/12.    Ostomy list received by SHIVA Clay and attached to Aidin referral.    PLAN: [place] home w/[facility]    / to remain available for support and/or discharge planning.     Opal ANGELO, LSW  Discharge Planner

## 2025-03-11 NOTE — PLAN OF CARE
Problem: Patient Centered Care  Goal: Patient preferences are identified and integrated in the patient's plan of care  Description: Interventions:  - What would you like us to know as we care for you? From home with   - Provide timely, complete, and accurate information to patient/family  - Incorporate patient and family knowledge, values, beliefs, and cultural backgrounds into the planning and delivery of care  - Encourage patient/family to participate in care and decision-making at the level they choose  - Honor patient and family perspectives and choices  Outcome: Progressing     Problem: Diabetes/Glucose Control  Goal: Glucose maintained within prescribed range  Description: INTERVENTIONS:  - Monitor Blood Glucose as ordered  - Assess for signs and symptoms of hyperglycemia and hypoglycemia  - Administer ordered medications to maintain glucose within target range  - Assess barriers to adequate nutritional intake and initiate nutrition consult as needed  - Instruct patient on self management of diabetes  Outcome: Progressing     Problem: Patient/Family Goals  Goal: Patient/Family Long Term Goal  Description: Patient's Long Term Goal: Discharge from the hospital    Interventions:  - Monitor vital signs  - Monitor appropriate labs  - Monitor blood glucose levels  - Pain management  - Wound and ostomy care  - Administer medications per order  - Follow MD orders  - Diagnostics per order  - Update / inform patient and family on plan of care  - Discharge planning  - See additional Care Plan goals for specific interventions  Outcome: Progressing  Goal: Patient/Family Short Term Goal  Description: Patient's Short Term Goal: Improve excoriation and redness of skin surrounding stoma. Prevent leakage from ileostomy appliance.     Interventions:   - Monitor vital signs  - Monitor appropriate labs  - Monitor blood glucose levels  - Pain management  - Wound and ostomy care  - Administer medications per order  - Follow  MD orders  - Diagnostics per order  - Update / inform patient and family on plan of care  - See additional Care Plan goals for specific interventions  Outcome: Progressing     Problem: PAIN - ADULT  Goal: Verbalizes/displays adequate comfort level or patient's stated pain goal  Description: INTERVENTIONS:  - Encourage pt to monitor pain and request assistance  - Assess pain using appropriate pain scale  - Administer analgesics based on type and severity of pain and evaluate response  - Implement non-pharmacological measures as appropriate and evaluate response  - Consider cultural and social influences on pain and pain management  - Manage/alleviate anxiety  - Utilize distraction and/or relaxation techniques  - Monitor for opioid side effects  - Notify MD/LIP if interventions unsuccessful or patient reports new pain  - Anticipate increased pain with activity and pre-medicate as appropriate  Outcome: Progressing     Problem: SAFETY ADULT - FALL  Goal: Free from fall injury  Description: INTERVENTIONS:  - Assess pt frequently for physical needs  - Identify cognitive and physical deficits and behaviors that affect risk of falls.  - Josephine fall precautions as indicated by assessment.  - Educate pt/family on patient safety including physical limitations  - Instruct pt to call for assistance with activity based on assessment  - Modify environment to reduce risk of injury  - Provide assistive devices as appropriate  - Consider OT/PT consult to assist with strengthening/mobility  - Encourage toileting schedule  Outcome: Progressing     Problem: DISCHARGE PLANNING  Goal: Discharge to home or other facility with appropriate resources  Description: INTERVENTIONS:  - Identify barriers to discharge w/pt and caregiver  - Include patient/family/discharge partner in discharge planning  - Arrange for needed discharge resources and transportation as appropriate  - Identify discharge learning needs (meds, wound care, etc)  -  Arrange for interpreters to assist at discharge as needed  - Consider post-discharge preferences of patient/family/discharge partner  - Complete POLST form as appropriate  - Assess patient's ability to be responsible for managing their own health  - Refer to Case Management Department for coordinating discharge planning if the patient needs post-hospital services based on physician/LIP order or complex needs related to functional status, cognitive ability or social support system  Outcome: Progressing     Problem: SKIN/TISSUE INTEGRITY - ADULT  Goal: Incision(s), wounds(s) or drain site(s) healing without S/S of infection  Description: INTERVENTIONS:  - Assess and document risk factors for pressure ulcer development  - Assess and document skin integrity  - Assess and document dressing/incision, wound bed, drain sites and surrounding tissue  - Implement wound care per orders  - Initiate isolation precautions as appropriate  - Initiate Pressure Ulcer prevention bundle as indicated  Outcome: Progressing      Monitoring vital signs- see flowsheets. Monitoring blood glucose levels. No acute changes noted at this moment. Safety and fall precautions maintained- bed alarm on, bed locked in lowest position, call light within reach. Frequent rounding by nursing staff.

## 2025-03-11 NOTE — PROGRESS NOTES
03/11/25 1010   Wound 02/27/25 Abdomen Lower;Medial   Date First Assessed/Time First Assessed: 02/27/25 0850   Primary Wound Type: Incision  Location: Abdomen  Wound Location Orientation: Lower;Medial   Wound Image    Site Assessment Fragile;Moist;Painful;Pink;Red;Excoriated   Drainage Amount None   Treatments Cleansed  (warm water, crusted with stoma powder and skin prep)   Dressing   (large emma seal, 1 3/4 inch 2 piece, ostomy belt)   Dressing Changed Changed   Dressing Status Clean;Dry;Intact   Vee-wound Assessment Fragile;Excoriated;Painful;Red   Ileostomy Standard (Claudette, end) RUQ   Placement Date/Time: 02/27/25 1540   Inserted by: Dr. Pereira  Ileostomy Type: Standard (Claudette, end)  Location: RUQ  Stoma Size (cm): (c) 1.8 cm  Appliance Size: 1 3/4   Stomal Appliance 2 piece;Intact  (changed for assessment)   Stomal Assessment Red;Moist  (slightly budding, 1 1/8 inch)   Peristomal Assessment Painful;Excoriation;Red  (pt. has been leaking)   Treatment Appliance change;Bag change;Site care   Dressing Change Due 03/15/25   Output (mL) 125 mL  (liquid tan)   Wound Follow Up   Follow up needed Yes       WOUND CARE NOTE    History:  Past Medical History:    Adenomatous polyp of colon    ALLERGIC RHINITIS    Anxiety state    Calculus of kidney    Colitis    Colon cancer (HCC)    Diabetes (HCC)    Diabetes (HCC)    Disorder of liver    fatty liver    High blood pressure    patient denies, states her BB is low    IBS (irritable bowel syndrome)    Ileostomy in place (HCC)    Neuropathy    OBESITY    OTHER DISEASES    ulcerative colitis in remission    Personal history of antineoplastic chemotherapy    PONV (postoperative nausea and vomiting)    SEASONAL ALLERGIES    Visual impairment    glasses     Past Surgical History:   Procedure Laterality Date    Appendectomy      Cholecystectomy      Colectomy  2012    80% removed    Colonoscopy  4/18, 2/17, 7/15, 4/15,  4/14, 12/13, 5/13, 12, 03    chromoendoscopy (7/15,  2/17, 4/18)    Colonoscopy N/A 02/03/2017    Procedure: COLONOSCOPY;  Surgeon: Obie Rosa MD;  Location: [facility] ENDOSCOPY    Colonoscopy N/A 04/20/2018    Procedure: COLONOSCOPY;  Surgeon: Obie Rosa MD;  Location: [facility] ENDOSCOPY    Colonoscopy  02/2020    Colonoscopy N/A 02/20/2020    Procedure: COLONOSCOPY;  Surgeon: Obie Rosa MD;  Location: [facility] ENDOSCOPY    D & c      Removal of kidney stone      x2    Tonsillectomy        Social History     Socioeconomic History    Marital status:    Tobacco Use    Smoking status: Never    Smokeless tobacco: Never   Vaping Use    Vaping status: Never Used   Substance and Sexual Activity    Alcohol use: Not Currently    Drug use: No     Social Drivers of Health     Food Insecurity: No Food Insecurity (3/10/2025)    NCSS - Food Insecurity     Worried About Running Out of Food in the Last Year: No     Ran Out of Food in the Last Year: No   Transportation Needs: No Transportation Needs (3/10/2025)    NCSS - Transportation     Lack of Transportation: No   Housing Stability: Not At Risk (3/10/2025)    NCSS - Housing/Utilities     Has Housing: Yes     Worried About Losing Housing: No     Unable to Get Utilities: No       PLAN   Recommendations:  Dr. Pereira is following the pt.   Staff to reinforce ileostomy care teaching  Glucose control to help promote wound healing      Ostomy: 1 3/4 inch 2 pc, large emma seal, crust red skin with stoma powder and skin prep, a bead of paste around the stoma, ostomy belt      Discharge Recommendations: Per the pt. her HH care nurse never showed after discharge , The nurse will speak with .       OBJECTIVE   MD Consult new frank stomal irritation       ASSESSMENT   Keyur Score:  Keyur Scale Score: 20    Chart Reviewed: yes    The pt. is sitting up in bed, she is known to me, I last saw and taught her 3/3/25 before her discharge. She had no leaking issues when in the hospital. I did teach the pt., her spouse and  daughter. Per the pt. her HH nurse never showed, and she started leaking. See the wound and ostomy assessment. Her skin was cleansed with water, applied stoma powder, brushed off the excess, dabbed with no sting skin prep, applied a large emma ring around the stoma and a bead of paste on the emma ring around the stoma. 1 3/4 inch 2 piece molded out to fit around the stoma, applied, and then applied a ostomy belt and I placed a warm wash clothe in a bag and placed over the appliance to warm up the adhesive. I reminded her no cream around the stoma. All questions answered. I will give the pt. home instructions and some supplies for home. Spoke with the nurse.      Ostomy: stoma moves in and out  Stoma location: RLQ  Stoma type: ileostomy   Stoma color: red, moist  Stoma condition: normal  Stoma diameter: 1 1/8 inches  Ostomy output: liquid stool  Stoma height: slightly budding  Peristomal skin: excoriated, red painful  Pouching system:two piece  Able to care for stoma: Yes     Allergies: Cat hair extract, Reglan [metoclopramide], Adhesive tape, and Latex    Labs:   Lab Results   Component Value Date    WBC 9.3 03/11/2025    HGB 12.6 03/11/2025    HCT 42.7 03/11/2025    .0 03/11/2025    CREATSERUM 1.21 (H) 03/11/2025    BUN 10 03/11/2025     03/11/2025    K 4.2 03/11/2025     03/11/2025    CO2 22.0 03/11/2025     (H) 03/11/2025    CA 8.2 (L) 03/11/2025    ALB 3.1 (L) 02/17/2020    ALKPHO 97 02/17/2020    BILT 0.50 02/17/2020    TP 7.6 02/17/2020    AST 17 02/17/2020    ALT 21 02/17/2020    MG 1.7 03/11/2025    PHOS 4.0 03/11/2025     No results found for: \"PREALBUMIN\"      Time Spent 1 Hour.    Danna Cordero RN  [facility]  [facility]  [phone]

## 2025-03-11 NOTE — CM/SW NOTE
03/11/25 1300   Discharge disposition   Expected discharge disposition Home-Health   Post Acute Care Provider   ([facility])   Outpatient services Wound care clinic   Discharge transportation Private car     NOELLE confirmed with RN Cortney who stated pt is medically ready for discharge today.  DC order placed.     updates attached them Aidin to [facility].  Daniel from [facility] made aware of discharge through Aidin Messages.    NOELLE confirmed that  [facility] contact information added to AVS.    PLAN: DC home with [facility]    Opal NIELSON MSW, LSW  Discharge Planner

## 2025-03-11 NOTE — DISCHARGE SUMMARY
[facility]  part of [facility]     DISCHARGE SUMMARY      [facility] Patient Status:  Observation    2/10/1960 MRN [id number]   Location [facility] 5SW/SE Attending Mello Crowley MD   Hosp Day # 1 PCP TEE DWYER     DATE OF ADMISSION: 3/10/2025  DATE OF DISCHARGE:  25  DISPOSITION: home  CONDITION ON DISCHARGE: good    DISCHARGE DIAGNOSES:    Ileostomy care (HCC)    Diabetes (HCC)    HISTORY OF PRESENT ILLNESS (COPIED FROM ADMISSION H&P)  Patient is a 65-year-old female with a complicated past medical history of colon cancer with recent total proctocolectomy with end ileostomy. Patient has come to the ER complaining of ongoing leakage from the bag and now she is having blistering of the skin because she is allergic to the adhesive. Patient's surgeon, Dr. Pereira, was called who recommended that she can be admitted to the hospital for wound care. Patient was seen and examined. Denies any fevers, chills, headaches or blurred vision, palpitations, or nausea, vomiting, diarrhea. She is endorsing ongoing leakage from the bag.     HOSPITAL COURSE:  Patient was admitted, seen by general surgery.  Felt to have mild dermatitis related to poor appliance care.  Wound care nurse was consulted.  Cleared by general surgery and wound care for discharge.    Patient understands return to the emergency room for increased pain, fever, discharge, shortness of breath, chest pain, new neurologic symptoms, other concerning symptoms.    PHYSICAL EXAM:  Temp:  [98.1 °F (36.7 °C)-98.5 °F (36.9 °C)] 98.2 °F (36.8 °C)  Pulse:  [80-88] 80  Resp:  [18-19] 18  BP: ()/(58-78) 115/58  SpO2:  [92 %-95 %] 95 %  Gen: A+Ox3.  No distress.   HEENT: NCAT, neck supple, no carotid bruit.  CV: RRR, S1S2, and intact distal pulses. No gallop, rub, murmur.  Pulm: Effort and breath sounds normal. No distress, wheezes, rales, rhonchi.  Abd: Soft, NTND, BS normal, no mass, no HSM, no rebound/guarding.  Ostomy  intact  Neuro: Normal reflexes, CN. Sensory/motor exams grossly normal deficit. Coordination  and gait normal.   MS: No joint effusions.  No peripheral edema.  Skin: Skin is warm and dry. No rashes, erythema, diaphoresis.   Psych: Normal mood and affect. Behavior and judgment normal.     DISCHARGE MEDICATIONS     Discharge Medications        CONTINUE taking these medications        Instructions Prescription details   famotidine 20 MG Tabs  Commonly known as: Pepcid      Take 1 tablet (20 mg total) by mouth 2 (two) times daily as needed for Heartburn.   Quantity: 60 tablet  Refills: 0     HYDROcodone-acetaminophen 7.5-325 MG Tabs  Commonly known as: Norco      Take 1 tablet by mouth every 4 (four) hours as needed for Pain.   Quantity: 40 tablet  Refills: 0     MAGNESIUM OR      Take 1 tablet by mouth daily.   Refills: 0     ondansetron 4 mg tablet  Commonly known as: Zofran      Take 1 tablet (4 mg total) by mouth every 8 (eight) hours as needed for Nausea.   Quantity: 10 tablet  Refills: 0            ASK your doctor about these medications        Instructions Prescription details   Farxiga 10 MG Tabs  Generic drug: dapagliflozin      Take 1 tablet (10 mg total) by mouth daily. RESTART when ok with Dr. Pereira   Refills: 0     Mounjaro 15 MG/0.5ML Soaj  Generic drug: Tirzepatide      Inject 1 Dose into the skin daily. RESTART when ok with Dr. Pereira   Refills: 0              CONSULTANTS  Consultants         Provider   Role Specialty     Manny Pereira MD      Consulting Physician SURGERY, GENERAL            FOLLOW UP:  Nata John  [address] [place] [address], [address]  [facility] [id number] [phone]    Follow up in 1 week(s)  Post Discharge Followup    The above plan and follow-up instructions were reviewed with the patient and they verbalized understanding and agreement.  They understand to return to the emergency room for any concerning signs or symptoms.  Greater than 30 minutes spent on  discharge.  -----------------------    Hospital Discharge Diagnoses:  Ileostomy complication    Lace+ Score: 65  59-90 High Risk  29-58 Medium Risk  0-28   Low Risk.    TCM Follow-Up Recommendation:  LACE > 58: High Risk of readmission after discharge from the hospital.  Supplementary Documentation:

## 2025-03-21 NOTE — DISCHARGE SUMMARY
[facility]      [facility] Patient Status:  Inpatient    2/10/1960 MRN [id number]   Location [facility] 4W/SW/SE Attending No att. 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     Diabetes mellitus type 2.     Hypomagnesemia   H/o UC  H/o nephrolithiasis  H/o HTN   H/o HL  CKD 2/3              History of Present Illness:   Per Dr. Figueroa   The patient is a 65-year-old  female with history of ulcerative colitis and prior history of colon cancer with hemicolectomy.  Recently had a colonoscopy which showed rectal polyps with high-grade dysplasia and other polyps, tubular adenomas.  Transurethral resection was not technically possible.  She was evaluated by Colorectal Surgery, Dr. Pereira, and scheduled today for above-mentioned procedure.  Post procedure, transferred to PACU for further monitoring.             Brief Synopsis:   High-grade dysplastic polyps with underlying ulcerative colitis, status post open total proctocolectomy with end ileostomy on 25 with Dr. 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 Recovering well  -Pain control.-->overall much better, still on IV dilaudid, worries about taking oxy (too strong), asking for norco instead, ordered  -Monitor surgical wound and drain.    -stoma care  -DVT prophylaxis, on hep subcutaneous per surgery  -Monitor hemodynamic status.        Morbid obesity.  Monitor respiratory and hemodynamic status.  Diabetes  mellitus type 2.  Monitor Accu-Cheks, sliding scale insulin.  Hypomagnesemia-->very low, Mag~1.3-->suppl  H/o UC  H/o nephrolithiasis  H/o HTN, current BP low, not on any BP meds, cont IVF until meaningful oral intake  H/o HL  CKD 2/3, Cr actually improving post op 1.45-->1.22, good urine output        dvt prophylaxis: sc heparin  code status: full code  dispo: home           Discharge Medication List:     Discharge Medications        START taking these medications        Instructions Prescription details   famotidine 20 MG Tabs  Commonly known as: Pepcid      Take 1 tablet (20 mg total) by mouth 2 (two) times daily as needed for Heartburn.   Quantity: 60 tablet  Refills: 0     HYDROcodone-acetaminophen 7.5-325 MG Tabs  Commonly known as: Norco      Take 1 tablet by mouth every 4 (four) hours as needed for Pain.   Quantity: 40 tablet  Refills: 0     ondansetron 4 mg tablet  Commonly known as: Zofran      Take 1 tablet (4 mg total) by mouth every 8 (eight) hours as needed for Nausea.   Quantity: 10 tablet  Refills: 0            CHANGE how you take these medications        Instructions Prescription details   Farxiga 10 MG Tabs  Generic drug: dapagliflozin  What changed: additional instructions      Take 1 tablet (10 mg total) by mouth daily. RESTART when ok with Dr. Pereira   Refills: 0     Mounjaro 15 MG/0.5ML Soaj  Generic drug: Tirzepatide  What changed: additional instructions      Inject 1 Dose into the skin daily. RESTART when ok with Dr. Pereira   Refills: 0               Where to Get Your Medications        These medications were sent to [id number] - [place], [place] - [address] [place] [phone], [phone]  [address] [place] [address]      Phone: [phone]   famotidine 20 MG Tabs  HYDROcodone-acetaminophen 7.5-325 MG Tabs  ondansetron 4 mg tablet         Follow-up appointment:   Nata John  [address], [address]  [facility][1] [id number] [phone]    Follow  up      Manny Pereira MD  [address]  [address]  [facility] [place] [address]  [phone]    Follow up on 3/14/2025  3/14 at 10am for hospital surgery follow up      Vital signs:       Physical Exam:    General: No acute distress. Eager to go home  Respiratory: Clear to auscultation bilaterally. No wheezes. No rhonchi.  Cardiovascular: S1, S2. Regular rate and rhythm. No murmurs, rubs or gallops.   Abdomen: Soft, mildly tender, +stoma  Neurologic: No focal neurological deficits.   Musculoskeletal: Moves all extremities.  Extremities: No edema.  -----------------------------------------------------------------------------------------------  PATIENT DISCHARGE INSTRUCTIONS: See electronic chart    I d/w pt   the available results, management plan, medications changes, and discharge instructions including follow ups as outlined above.          Hospital Discharge Diagnoses:  post op    Lace+ Score: 44  59-90 High Risk  29-58 Medium Risk  0-28   Low Risk.    TCM Follow-Up Recommendation:  LACE 29-58: Moderate Risk of readmission after discharge from the hospital.        MILLY PANDYA MD 3/20/2025    Time spent:  > 30 minutes

## 2025-03-22 NOTE — OPERATIVE REPORT
Operative Note    Patient Name: [facility]    Date of Surgery: 02/27/25    Preoperative Diagnosis: Ulcerative colitis, rectal polyp    Postoperative Diagnosis: same    Primary Surgeon: Manny Pereira MD    Assistant: PRISCA HERNANDEZ    Procedures: Laparotomy, total proctocolectomy, intersphincteric proctectomy, end ileostomy, extended (850am - 1559pm)    Surgical Findings: normal small bowel without evidence of Crohn's Disease, no evidence of metastatic disease, extensive intraabdominal adipose and difficult exposure for pelvic dissection     Anesthesia: General    Complications: none    Implants: none    Specimen: colon, rectum, and anus    Drains: 15 Fr Tesfaye drain in presacral space    Condition: good    Estimated Blood Loss: 100 mL    DESCRIPTION OF PROCEDURE    INDICATION   The patient is a 65 year old female with long standing ulcerative colitis. She has been found to have an adenomatous polyp in the very distal rectum. A transanal excision was attempted but not able to eliminate the polyp due to its location and mild stricturing of the anorectal junction. We discussed options for permanent end ileostomy or reconstruction with a pelvic pouch. She elected permanent ileostomy due to lowest likelihood of complications. Related to longstanding ulcerative colitis she has had prior right hemicolectomy for cancer. She also has morbid obesity (BMI 42 after recent 100 pound weight loss). A prior attempt at transanal excision of the rectal adenoma was not successful at localizing, accessing and removing the polyp.     The procedure, indications, risks, benefits, and alternatives were discussed with the patient prior to the procedure. All questions were answered, and the patient wished to proceed.    TECHNIQUE  The patient was taken to the operating room and placed supine on the operating table. Sequential compression boots were placed and functioning throughout the procedure. IV Antibiotics and subcutaneous heparin  were administered. General anesthesia was induced with the placement of endotracheal tube. A urinary catheter was inserted. She was then repositioned in modified lithotomy using Faraz Kal stirrups with appropriate attention to all joints and pressure points. The abdomen was prepared with Chloraprep and draped in the usual sterile fashion. Timeout was called to reconfirm the patient's identity, diagnosis, planned procedure, and completeness of preoperative preparations.    The procedure began with midline laparotomy. The abdomen was safely entered without injury to underlying viscera. Adhesions were taken down with scissors without injury to bowel. The Evgeny wound retractor was placed. Survey of the abdomen demonstrated the above findings.    The ileocolic anastomosis and proximal colon was mobilized from lateral to medial. Transverse colon was placed up into the upper abdomen. The retroperitoneal plane was entered and dissected bluntly. The dissection then continued medially over the right kidney and under the hepatic flexure. The duodenum, ureter and gonadal vessels were identified and pushed posteriorly free from injury. A window was made at the mesenteric edge of the terminal ileum. The mesentery was then divided with Ligasure. At this point the lesser sac was dissected exposing the right branch of the middle colic vessels and these were divided similarly.     Having completed the medial phase of dissection, we then divided the omental and lateral peritoneal attachments at the white line of Toldt of the right colon. Once all attachments of the remaining right and transverse colon were divided the sigmoid colon was mobilized from lateral to medial by incising along the white line of Toldt.  We dissected the left mesocolon off of the retroperitoneum and were able to identify the left ureter and gonadal vessels which were pushed posteriorly free from injury. The splenic flexure was taken down without injury to the  spleen or tail of the pancreas. The IMV was divided under the pancreas. The TRISHA was divided at its origin.    Attention was then turned towards the pelvic phase of the operation. The plane between fascia propria of the rectum and Waldeyer's fascia was developed sharply under direct vision at the sacral promontory. Dissection was carried down to the pelvic floor.     Next, an inersphincteric proctectomy was performed. An incision was made at the interphincteric groove. The intersphincteric plane was circumferentially developed in a proximal direction until the pelvic dissection was reached. At this point the specimen was completely freed and the vagina and bladder were uninjured. The external anal sphincter was then closed using 2 layers of 0-Vicryl suture.    The specimen was then retrieved and opened on a back table. The specimen showed a subtle  flat polyp in the region of the anorectal junction.      An aperture was made for the stoma in the RLQ at the preoperatively marked stoma site.  The bowel was oriented so that the cut edge of the mesentery was straight and the end of the ileum delivered through it.     The abdomen was irrigated and suctioned. We then closed the midline incision using running #1 PDS suture in the fascia. Subcutaneous tissues were irrigated and then hemostasis secured with cautery. Skin was closed using skin staples. The incision was covered with an occlusive dressing to quarantine it.    The ileostomy was matured in standard Claudette fashion. Four sutures of 3-0 chromic were used to create the mucocutaneous anastomosis with intervening seromuscular bites to michelle the ilestomy above skin level. An additional 4 sutures were placed without the intervening seromuscular bite. An ileostomy appliance was then placed over the ileostomy.     Dry sterile dressing was applied to the midline incision.    The patient was then allowed to emerge from anesthesia without incident. The patient was extubated in  the operating room and taken to the recovery room in satisfactory condition.    I was present throughout the entire operation.    Please note, this operation was significantly prolonged and required substantial effort beyond a standard procedure due to adhesions from prior surgery and extensive intraabdominal adipose tissue creating difficult exposure for pelvic dissection. It consumed 7 hours and exceeded normal operative time by 3.      (Ms. Byrne' skilled assistance was necessary for patient positioning, prepping, instrument passing and holding, retraction, and suturing.)        _________________________________   Attending Surgeon: Manny Pereira MD   Dictated By: Manny Pereira MD

## 2025-03-26 ENCOUNTER — APPOINTMENT (OUTPATIENT)
Dept: WOUND CARE | Facility: HOSPITAL | Age: 65
End: 2025-03-26
Attending: NURSE PRACTITIONER
Payer: COMMERCIAL

## (undated) DEVICE — ANTIBACTERIAL UNDYED BRAIDED (POLYGLACTIN 910), SYNTHETIC ABSORBABLE SUTURE: Brand: COATED VICRYL

## (undated) DEVICE — SUT CHRM GUT 2-0 18IN ABSRB UD TIE CLLGN

## (undated) DEVICE — SLIM BODY SKIN STAPLER: Brand: APPOSE ULC

## (undated) DEVICE — SUT COAT VCRL 2-0 27IN SH ABSRB UD 26MM 1/2

## (undated) DEVICE — ISLAND DRESSING 4IN X 13IN: Brand: SILVERLON ANTIMICROBIAL WOUND DRESSING

## (undated) DEVICE — Device

## (undated) DEVICE — BLAKE SILICONE DRAIN, 15 FR ROUND, HUBLESS: Brand: BLAKE

## (undated) DEVICE — ADHESIVE LIQ 2/3ML VI MASTISOL

## (undated) DEVICE — CLEANER,CAUTERY TIP,2X2",STERILE: Brand: MEDLINE

## (undated) DEVICE — E-Z CLEAN, NON-STICK, PTFE COATED, ELECTROSURGICAL NEEDLE ELECTRODE, MODIFIED EXTENDED INSULATION, 2.75 INCH (7 CM): Brand: MEGADYNE

## (undated) DEVICE — PROVIDES A STERILE INTERFACE BETWEEN THE OPERATING ROOM SURGICAL LAMPS (NON-STERILE) AND THE SURGEON OR NURSE (STERILE).: Brand: STERION®CLAMP COVER FABRIC

## (undated) DEVICE — SOLUTION IRRIG 1000ML 0.9% NACL USP BTL

## (undated) DEVICE — ENDOSCOPY PACK - LOWER: Brand: MEDLINE INDUSTRIES, INC.

## (undated) DEVICE — SYRINGE MED 10ML LL TIP W/O SFTY DISP

## (undated) DEVICE — INSULATED NEEDLE ELECTRODE: Brand: EDGE

## (undated) DEVICE — SUT VCRL 0 L18IN ABSRB VLT POLYGLACTIN 910

## (undated) DEVICE — MINOR GENERAL: Brand: MEDLINE INDUSTRIES, INC.

## (undated) DEVICE — STAPLER WITH DST SERIES TECHNOLOGY: Brand: GIA

## (undated) DEVICE — GOWN,SLEEVE,STERILE,W/CSR WRAP,1/P: Brand: MEDLINE

## (undated) DEVICE — CONTAINER,SPECIMEN,OR STERILE,4OZ: Brand: MEDLINE

## (undated) DEVICE — SHEET,DRAPE,53X77,STERILE: Brand: MEDLINE

## (undated) DEVICE — CAUTERY: TIP CLEANER XR 100/CS: Brand: MEDICAL ACTION INDUSTRIES

## (undated) DEVICE — SUT CHRM GUT 0 27IN CT ABSRB UD 40MM 1/2

## (undated) DEVICE — 3M™ IOBAN™ 2 ANTIMICROBIAL INCISE DRAPE 6640EZ: Brand: IOBAN™ 2

## (undated) DEVICE — 3M™ TEGADERM™ TRANSPARENT FILM DRESSING FRAME STYLE, 1629, 8 IN X 12 IN (20 CM X 30 CM), 10/CT 8CT/CASE: Brand: 3M™ TEGADERM™

## (undated) DEVICE — ZZ-DISC-SUB-448781-SUT COAT VCRL 2-0 27IN CP-1 ABSRB UD 36MM 1/2

## (undated) DEVICE — WECK HORIZON LARGE ORANGE CLIP DISP

## (undated) DEVICE — FORCEP RADIAL JAW 4

## (undated) DEVICE — D AND C PACK: Brand: MEDLINE INDUSTRIES, INC.

## (undated) DEVICE — SUT CHRM GUT 3-0 27IN SH ABSRB UD 26MM 1/2

## (undated) DEVICE — GAMMEX® PI HYBRID SIZE 7.5, STERILE POWDER-FREE SURGICAL GLOVE, POLYISOPRENE AND NEOPRENE BLEND: Brand: GAMMEX

## (undated) DEVICE — MEDI-VAC NON-CONDUCTIVE SUCTION TUBING 6MM X 1.8M (6FT.) L: Brand: CARDINAL HEALTH

## (undated) DEVICE — 35 ML SYRINGE REGULAR TIP: Brand: MONOJECT

## (undated) DEVICE — MEDI-VAC NON-CONDUCTIVE SUCTION TUBING: Brand: CARDINAL HEALTH

## (undated) DEVICE — SIGMOIDOSCOPE LIGHTED BIOSEAL

## (undated) DEVICE — CURVED, LARGE JAW, OPEN SEALER/DIVIDER NANO-COATED: Brand: LIGASURE IMPACT

## (undated) DEVICE — Device: Brand: CUSTOM PROCEDURE KIT

## (undated) DEVICE — COUNTER NDL 10 CT HLD 20 FOAM BLK ADH

## (undated) DEVICE — GAUZE,SPONGE,4"X4",16PLY,XRAY,STRL,LF: Brand: MEDLINE

## (undated) DEVICE — 3 ML SYRINGE LUER-LOCK TIP: Brand: MONOJECT

## (undated) DEVICE — TOWEL,OR,DSP,ST,BLUE,DLX,2/PK,40PK/CS: Brand: MEDLINE

## (undated) DEVICE — WECK HORIZON MED BLUE CLIP DISP

## (undated) DEVICE — SLEEVE PROTCT SUR 1 SZ ST SMS EVOL 4

## (undated) DEVICE — INSULATED BLADE ELECTRODE;CAUTION: FOR MANUFACTURING, PROCESSING, OR REPACKING.: Brand: EDGE

## (undated) DEVICE — NEEDLE BLNT 18GA L1.5IN FILL DISP PRECISGLDE

## (undated) DEVICE — 6 ML SYRINGE LUER-LOCK TIP: Brand: MONOJECT

## (undated) DEVICE — SUTURE PERMAHAND SZ 2-0 L30IN 10X30IN TIE

## (undated) DEVICE — ZZ-CONVERTED-TO-522442- SPONGE 4X4 10PK

## (undated) DEVICE — A P RESECTION: Brand: MEDLINE INDUSTRIES, INC.

## (undated) DEVICE — SUT ETHLN 3-0 30IN FS-1 NABSRB BLK 24MM 3/8 C

## (undated) DEVICE — SUT PDS II 1 36IN ABSRB VLT L36MM CT-1

## (undated) DEVICE — RETAINER ORGAN M W5.875XL9.125IN ABD RADPQ

## (undated) DEVICE — GOWN,SIRUS,FABRNF,RAGLAN,XL,ST,28/CS: Brand: MEDLINE

## (undated) DEVICE — PENCIL ES BTTN SWCH W/ TIP HOLSTER E-Z CLN

## (undated) DEVICE — SPONGE LAP 18X18IN WHT COT 4 PLY FLD STRUNG

## (undated) DEVICE — UNDERPANTS INCONT 2XL KNIT MAT

## (undated) DEVICE — BINDER ABD L/XL H12XL62IN 4 PNL UNIV PREM

## (undated) DEVICE — SOLUTION PREP 10% POVIDONE IOD 32OZ BTL

## (undated) DEVICE — EVACUATOR SUR 100CC SIL BLB WND

## (undated) DEVICE — SYRINGE MED 20ML STD CLR PLAS LL TIP N CTRL

## (undated) DEVICE — 450 ML BOTTLE OF 0.05% CHLORHEXIDINE GLUCONATE IN 99.95% STERILE WATER FOR IRRIGATION, USP AND APPLICATOR.: Brand: IRRISEPT ANTIMICROBIAL WOUND LAVAGE

## (undated) DEVICE — Device: Brand: DEFENDO AIR/WATER/SUCTION AND BIOPSY VALVE

## (undated) DEVICE — NEEDLE SPNL 20GA L3.5IN YEL QNCKE STYL DISP

## (undated) DEVICE — STERILE LATEX POWDER-FREE SURGICAL GLOVESWITH NITRILE COATING: Brand: PROTEXIS

## (undated) DEVICE — Device: Brand: JELCO

## (undated) DEVICE — YANKAUER,FLEXIBLE HANDLE,REGLR CAPACITY: Brand: MEDLINE INDUSTRIES, INC.

## (undated) DEVICE — MEGADYNE 6.5IN BLADE MONO

## (undated) DEVICE — WOUND RETRACTOR AND PROTECTOR: Brand: ALEXIS O WOUND PROTECTOR-RETRACTOR

## (undated) DEVICE — LINE MNTR ADLT SET O2 INTMD

## (undated) DEVICE — RETRACTOR STAY HOOK 5MM SHARP LONE STAR BLUE DISP